# Patient Record
Sex: FEMALE | Race: WHITE | NOT HISPANIC OR LATINO
[De-identification: names, ages, dates, MRNs, and addresses within clinical notes are randomized per-mention and may not be internally consistent; named-entity substitution may affect disease eponyms.]

---

## 2017-04-19 ENCOUNTER — APPOINTMENT (OUTPATIENT)
Dept: DERMATOLOGY | Facility: CLINIC | Age: 77
End: 2017-04-19

## 2017-08-24 ENCOUNTER — INPATIENT (INPATIENT)
Facility: HOSPITAL | Age: 77
LOS: 0 days | Discharge: HOME | DRG: 392 | End: 2017-08-25
Attending: HOSPITALIST | Admitting: HOSPITALIST
Payer: MEDICARE

## 2017-08-24 VITALS — WEIGHT: 136.03 LBS | HEIGHT: 60 IN

## 2017-08-24 DIAGNOSIS — K57.32 DIVERTICULITIS OF LARGE INTESTINE WITHOUT PERFORATION OR ABSCESS WITHOUT BLEEDING: ICD-10-CM

## 2017-08-24 LAB
ALBUMIN SERPL ELPH-MCNC: 3.8 G/DL — SIGNIFICANT CHANGE UP (ref 3.3–5.2)
ALP SERPL-CCNC: 173 U/L — HIGH (ref 40–120)
ALT FLD-CCNC: 63 U/L — HIGH
ANION GAP SERPL CALC-SCNC: 13 MMOL/L — SIGNIFICANT CHANGE UP (ref 5–17)
ANISOCYTOSIS BLD QL: SLIGHT — SIGNIFICANT CHANGE UP
APPEARANCE UR: CLEAR — SIGNIFICANT CHANGE UP
AST SERPL-CCNC: 40 U/L — HIGH
BASOPHILS # BLD AUTO: 0 K/UL — SIGNIFICANT CHANGE UP (ref 0–0.2)
BASOPHILS NFR BLD AUTO: 1 % — SIGNIFICANT CHANGE UP (ref 0–2)
BILIRUB SERPL-MCNC: 0.6 MG/DL — SIGNIFICANT CHANGE UP (ref 0.4–2)
BILIRUB UR-MCNC: NEGATIVE — SIGNIFICANT CHANGE UP
BUN SERPL-MCNC: 14 MG/DL — SIGNIFICANT CHANGE UP (ref 8–20)
CALCIUM SERPL-MCNC: 9.7 MG/DL — SIGNIFICANT CHANGE UP (ref 8.6–10.2)
CHLORIDE SERPL-SCNC: 99 MMOL/L — SIGNIFICANT CHANGE UP (ref 98–107)
CO2 SERPL-SCNC: 27 MMOL/L — SIGNIFICANT CHANGE UP (ref 22–29)
COLOR SPEC: YELLOW — SIGNIFICANT CHANGE UP
CREAT SERPL-MCNC: 0.8 MG/DL — SIGNIFICANT CHANGE UP (ref 0.5–1.3)
DIFF PNL FLD: ABNORMAL
EOSINOPHIL # BLD AUTO: 0.1 K/UL — SIGNIFICANT CHANGE UP (ref 0–0.5)
EOSINOPHIL NFR BLD AUTO: 1 % — SIGNIFICANT CHANGE UP (ref 0–5)
EPI CELLS # UR: SIGNIFICANT CHANGE UP
GLUCOSE SERPL-MCNC: 95 MG/DL — SIGNIFICANT CHANGE UP (ref 70–115)
GLUCOSE UR QL: NEGATIVE MG/DL — SIGNIFICANT CHANGE UP
HCT VFR BLD CALC: 44.3 % — SIGNIFICANT CHANGE UP (ref 37–47)
HGB BLD-MCNC: 15.1 G/DL — SIGNIFICANT CHANGE UP (ref 12–16)
KETONES UR-MCNC: NEGATIVE — SIGNIFICANT CHANGE UP
LEUKOCYTE ESTERASE UR-ACNC: ABNORMAL
LYMPHOCYTES # BLD AUTO: 1.8 K/UL — SIGNIFICANT CHANGE UP (ref 1–4.8)
LYMPHOCYTES # BLD AUTO: 12 % — LOW (ref 20–55)
MACROCYTES BLD QL: SLIGHT — SIGNIFICANT CHANGE UP
MCHC RBC-ENTMCNC: 32.5 PG — HIGH (ref 27–31)
MCHC RBC-ENTMCNC: 34.1 G/DL — SIGNIFICANT CHANGE UP (ref 32–36)
MCV RBC AUTO: 95.5 FL — SIGNIFICANT CHANGE UP (ref 81–99)
MICROCYTES BLD QL: SLIGHT — SIGNIFICANT CHANGE UP
MONOCYTES # BLD AUTO: 1.8 K/UL — HIGH (ref 0–0.8)
MONOCYTES NFR BLD AUTO: 6 % — SIGNIFICANT CHANGE UP (ref 3–10)
NEUTROPHILS # BLD AUTO: 12.2 K/UL — HIGH (ref 1.8–8)
NEUTROPHILS NFR BLD AUTO: 77 % — HIGH (ref 37–73)
NEUTS BAND # BLD: 1 % — SIGNIFICANT CHANGE UP (ref 0–8)
NITRITE UR-MCNC: NEGATIVE — SIGNIFICANT CHANGE UP
OVALOCYTES BLD QL SMEAR: SLIGHT — SIGNIFICANT CHANGE UP
PH UR: 5 — SIGNIFICANT CHANGE UP (ref 5–8)
PLAT MORPH BLD: NORMAL — SIGNIFICANT CHANGE UP
PLATELET # BLD AUTO: 228 K/UL — SIGNIFICANT CHANGE UP (ref 150–400)
POIKILOCYTOSIS BLD QL AUTO: SLIGHT — SIGNIFICANT CHANGE UP
POTASSIUM SERPL-MCNC: 4 MMOL/L — SIGNIFICANT CHANGE UP (ref 3.5–5.3)
POTASSIUM SERPL-SCNC: 4 MMOL/L — SIGNIFICANT CHANGE UP (ref 3.5–5.3)
PROT SERPL-MCNC: 7.7 G/DL — SIGNIFICANT CHANGE UP (ref 6.6–8.7)
PROT UR-MCNC: NEGATIVE MG/DL — SIGNIFICANT CHANGE UP
RBC # BLD: 4.64 M/UL — SIGNIFICANT CHANGE UP (ref 4.4–5.2)
RBC # FLD: 13.1 % — SIGNIFICANT CHANGE UP (ref 11–15.6)
RBC BLD AUTO: ABNORMAL
RBC CASTS # UR COMP ASSIST: SIGNIFICANT CHANGE UP /HPF (ref 0–4)
SODIUM SERPL-SCNC: 139 MMOL/L — SIGNIFICANT CHANGE UP (ref 135–145)
SP GR SPEC: 1 — LOW (ref 1.01–1.02)
UROBILINOGEN FLD QL: NEGATIVE MG/DL — SIGNIFICANT CHANGE UP
VARIANT LYMPHS # BLD: 2 % — SIGNIFICANT CHANGE UP (ref 0–6)
WBC # BLD: 16 K/UL — HIGH (ref 4.8–10.8)
WBC # FLD AUTO: 16 K/UL — HIGH (ref 4.8–10.8)
WBC UR QL: SIGNIFICANT CHANGE UP

## 2017-08-24 PROCEDURE — 99223 1ST HOSP IP/OBS HIGH 75: CPT | Mod: AI

## 2017-08-24 PROCEDURE — 99285 EMERGENCY DEPT VISIT HI MDM: CPT

## 2017-08-24 RX ORDER — METRONIDAZOLE 500 MG
500 TABLET ORAL ONCE
Qty: 0 | Refills: 0 | Status: COMPLETED | OUTPATIENT
Start: 2017-08-24 | End: 2017-08-24

## 2017-08-24 RX ORDER — SODIUM CHLORIDE 9 MG/ML
1000 INJECTION INTRAMUSCULAR; INTRAVENOUS; SUBCUTANEOUS ONCE
Qty: 0 | Refills: 0 | Status: COMPLETED | OUTPATIENT
Start: 2017-08-24 | End: 2017-08-24

## 2017-08-24 RX ORDER — CIPROFLOXACIN LACTATE 400MG/40ML
400 VIAL (ML) INTRAVENOUS ONCE
Qty: 0 | Refills: 0 | Status: COMPLETED | OUTPATIENT
Start: 2017-08-24 | End: 2017-08-24

## 2017-08-24 RX ORDER — ERTAPENEM SODIUM 1 G/1
1000 INJECTION, POWDER, LYOPHILIZED, FOR SOLUTION INTRAMUSCULAR; INTRAVENOUS EVERY 24 HOURS
Qty: 0 | Refills: 0 | Status: DISCONTINUED | OUTPATIENT
Start: 2017-08-25 | End: 2017-08-25

## 2017-08-24 RX ORDER — SODIUM CHLORIDE 9 MG/ML
1000 INJECTION INTRAMUSCULAR; INTRAVENOUS; SUBCUTANEOUS
Qty: 0 | Refills: 0 | Status: DISCONTINUED | OUTPATIENT
Start: 2017-08-24 | End: 2017-08-25

## 2017-08-24 RX ORDER — ERTAPENEM SODIUM 1 G/1
1000 INJECTION, POWDER, LYOPHILIZED, FOR SOLUTION INTRAMUSCULAR; INTRAVENOUS ONCE
Qty: 0 | Refills: 0 | Status: COMPLETED | OUTPATIENT
Start: 2017-08-24 | End: 2017-08-24

## 2017-08-24 RX ORDER — ERTAPENEM SODIUM 1 G/1
INJECTION, POWDER, LYOPHILIZED, FOR SOLUTION INTRAMUSCULAR; INTRAVENOUS
Qty: 0 | Refills: 0 | Status: DISCONTINUED | OUTPATIENT
Start: 2017-08-24 | End: 2017-08-25

## 2017-08-24 RX ORDER — ACETAMINOPHEN 500 MG
650 TABLET ORAL EVERY 6 HOURS
Qty: 0 | Refills: 0 | Status: DISCONTINUED | OUTPATIENT
Start: 2017-08-24 | End: 2017-08-25

## 2017-08-24 RX ORDER — SODIUM CHLORIDE 9 MG/ML
3 INJECTION INTRAMUSCULAR; INTRAVENOUS; SUBCUTANEOUS ONCE
Qty: 0 | Refills: 0 | Status: COMPLETED | OUTPATIENT
Start: 2017-08-24 | End: 2017-08-24

## 2017-08-24 RX ORDER — ACETAMINOPHEN 500 MG
975 TABLET ORAL ONCE
Qty: 0 | Refills: 0 | Status: COMPLETED | OUTPATIENT
Start: 2017-08-24 | End: 2017-08-24

## 2017-08-24 RX ADMIN — Medication 200 MILLIGRAM(S): at 11:57

## 2017-08-24 RX ADMIN — SODIUM CHLORIDE 1000 MILLILITER(S): 9 INJECTION INTRAMUSCULAR; INTRAVENOUS; SUBCUTANEOUS at 12:12

## 2017-08-24 RX ADMIN — SODIUM CHLORIDE 75 MILLILITER(S): 9 INJECTION INTRAMUSCULAR; INTRAVENOUS; SUBCUTANEOUS at 15:58

## 2017-08-24 RX ADMIN — ERTAPENEM SODIUM 120 MILLIGRAM(S): 1 INJECTION, POWDER, LYOPHILIZED, FOR SOLUTION INTRAMUSCULAR; INTRAVENOUS at 14:32

## 2017-08-24 RX ADMIN — SODIUM CHLORIDE 75 MILLILITER(S): 9 INJECTION INTRAMUSCULAR; INTRAVENOUS; SUBCUTANEOUS at 23:48

## 2017-08-24 RX ADMIN — SODIUM CHLORIDE 3 MILLILITER(S): 9 INJECTION INTRAMUSCULAR; INTRAVENOUS; SUBCUTANEOUS at 13:43

## 2017-08-24 RX ADMIN — Medication 975 MILLIGRAM(S): at 16:38

## 2017-08-24 RX ADMIN — Medication 100 MILLIGRAM(S): at 11:17

## 2017-08-24 NOTE — H&P ADULT - NSHPPHYSICALEXAM_GEN_ALL_CORE
General appearance: NAD, Awake, Alert  HEENT: NCAT, Conjunctiva clear, EOMI, Pupils reactive  Neck: Supple, No JVD, No tenderness  Lungs: Clear to auscultation, Breath sound equal bilaterally, No wheezes, No rales  Cardiovascular: S1S2, Regular rhythm  Abdomen: Soft, Nondistended, No guarding/rebound, Positive bowel sounds, Mild lower quadrant tenderness  Extremities: No clubbing, No cyanosis, No edema, No calf tenderness  Neuro: Strength equal bilaterally, No tremors  Psychiatric: Appropriate mood, Normal affect

## 2017-08-24 NOTE — ED ADULT NURSE NOTE - OBJECTIVE STATEMENT
pt alert and awake x3, arrived to ED with abd cramping and discomfort x9 days, states she was at PMD office, CT scan was preformed, results showed diverticulitis, pt was prescribed, cipro and flagyl, states the meds are making her stomach cramp more, got nervous when she caw clear mucous instead of stool when going to bathroom, denies urinary problems, denies chest pain/sob, will continue to monitor

## 2017-08-24 NOTE — H&P ADULT - ASSESSMENT
77F with diverticulitis and worsening abdominal pain    Diverticulitis - Intravenous antibiotics. CBC to be repeated to monitor leukocytosis. Serial abdominal examinations to follow.     Hypertension - Lotrel to be held for now. Close blood pressure monitoring.

## 2017-08-24 NOTE — ED STATDOCS - OBJECTIVE STATEMENT
78 y/o F pt with PMHx of diverticulosis of colon, spinal stenosis, HTN, and MVP presents to ED c/o waxing and waning, lower abdominal pain and back pain x9 days. Pt was seen by Dr. Bowden yesterday, where a CT was done at [facility], and she was sent to the ED for further evaluation of diverticulitis. Pt reports she was started on 2 abx (Cipro and Flagyl) yesterday. She states she has the urge for a BM, but "mucous stuff comes out." On the first day of her pain, she had 1 episode of vomiting and diarrhea. Pt also notes decreased PO intake. Pt denies fever, chills, CP, SOB, dysuria, hematuria, urinary frequency/urgency/hesitancy, back pain, headache, and dizziness. No further complaints at this time. Allergy to iodine, amoxicillin, and codeine. 78 y/o F pt with PMHx of diverticulosis of colon, spinal stenosis, HTN, and MVP presents to ED c/o waxing and waning, lower abdominal pain and back pain x9 days. Pt was seen by Dr. Murillo yesterday, where a CT was done at [facility]. Pt reports she was started on 2 abx (Cipro and Flagyl) yesterday: took 3 doses of flagyl but only 1 dose of cipro. She states she has the urge for a BM, but "mucous stuff comes out." On the first day of her pain, she had 1 episode of vomiting and diarrhea. Pt also notes decreased PO intake. Pt denies fever, chills, CP, SOB, dysuria, hematuria, urinary frequency/urgency/hesitancy, back pain, headache, and dizziness. No further complaints at this time. Allergy to iodine, amoxicillin, and codeine.

## 2017-08-24 NOTE — ED STATDOCS - ATTENDING CONTRIBUTION TO CARE
I, Jermanie Owen, performed the initial face to face bedside interview with this patient regarding history of present illness, review of symptoms and relevant past medical, social and family history.  I completed an independent physical examination.  I was the provider who initially evaluated this patient.  The history, relevant review of systems, past medical and surgical history, medical decision making, and physical examination was documented by the scribe in my presence and I attest to the accuracy of the documentation. Follow-up on ordered tests (ie labs, radiologic studies) and re-evaluation of the patient's status has been communicated to the ACP.  Disposition of the patient will be based on test outcome and response to ED interventions.

## 2017-08-24 NOTE — ED STATDOCS - PMH
Diverticulosis of the Colon    History of Spinal Stenosis    HTN (Hypertension)    MVP (Mitral Valve Prolapse)    Normal delivery  X 2

## 2017-08-24 NOTE — ED STATDOCS - PSH
Bladder Prolapse, Acquired  Geovany procedure  History of Laminectomy  lumbar for stenosis with bone graft fusion  History of Rotator Cuff Surgery  laparoscopic repair right  S/P Appendectomy    S/P Arthroscopy  left shoulder for bone spurs  S/P Exploratory Laparotomy  for pelvic mass/benign/not removed/ "shrunk after menopause"  S/P Lumbar Spinal Fusion    Skin Lesion  cryo procedure to scalp skin lesion

## 2017-08-24 NOTE — H&P ADULT - HISTORY OF PRESENT ILLNESS
77F presented with abdominal pain. The patient reports a history of abdominal pain starting about one week ago. She had presented to her primary care physician and was noted to have diverticulitis on CT scan and was started on oral antibiotics. The patient had initiated antibiotic therapy but had continued to have abdominal pain. She denied any fever or chills at home. She had one episode of vomiting initially and then had episodes of diarrhea. The patient had recurrent episodes of abdominal pain with any attempt to eat. She denied any change in her diet, recent travel, or sick contacts. The pain was located in the lower abdomen and was sharp in nature. She denied any rectal bleeding and noted that she was passing mucous with her bowel movements. She also had back pain which she attributed to her prior spinal surgery.    Outpatient CT abdomen results reported diverticulitis without evidence of abscess

## 2017-08-24 NOTE — ED STATDOCS - MEDICAL DECISION MAKING DETAILS
Eval for diverticulitis. diverticulitis, failing outpatient oral antibiotic treatment:  labs and possible admit.

## 2017-08-25 ENCOUNTER — TRANSCRIPTION ENCOUNTER (OUTPATIENT)
Age: 77
End: 2017-08-25

## 2017-08-25 VITALS
HEART RATE: 75 BPM | OXYGEN SATURATION: 94 % | RESPIRATION RATE: 18 BRPM | TEMPERATURE: 99 F | SYSTOLIC BLOOD PRESSURE: 126 MMHG | DIASTOLIC BLOOD PRESSURE: 74 MMHG

## 2017-08-25 LAB
ANION GAP SERPL CALC-SCNC: 15 MMOL/L — SIGNIFICANT CHANGE UP (ref 5–17)
BUN SERPL-MCNC: 8 MG/DL — SIGNIFICANT CHANGE UP (ref 8–20)
CALCIUM SERPL-MCNC: 8.5 MG/DL — LOW (ref 8.6–10.2)
CHLORIDE SERPL-SCNC: 106 MMOL/L — SIGNIFICANT CHANGE UP (ref 98–107)
CO2 SERPL-SCNC: 20 MMOL/L — LOW (ref 22–29)
CREAT SERPL-MCNC: 0.65 MG/DL — SIGNIFICANT CHANGE UP (ref 0.5–1.3)
GLUCOSE SERPL-MCNC: 99 MG/DL — SIGNIFICANT CHANGE UP (ref 70–115)
HCT VFR BLD CALC: 40.3 % — SIGNIFICANT CHANGE UP (ref 37–47)
HGB BLD-MCNC: 13.3 G/DL — SIGNIFICANT CHANGE UP (ref 12–16)
MCHC RBC-ENTMCNC: 31.8 PG — HIGH (ref 27–31)
MCHC RBC-ENTMCNC: 33 G/DL — SIGNIFICANT CHANGE UP (ref 32–36)
MCV RBC AUTO: 96.4 FL — SIGNIFICANT CHANGE UP (ref 81–99)
PLATELET # BLD AUTO: 195 K/UL — SIGNIFICANT CHANGE UP (ref 150–400)
POTASSIUM SERPL-MCNC: 3.7 MMOL/L — SIGNIFICANT CHANGE UP (ref 3.5–5.3)
POTASSIUM SERPL-SCNC: 3.7 MMOL/L — SIGNIFICANT CHANGE UP (ref 3.5–5.3)
RBC # BLD: 4.18 M/UL — LOW (ref 4.4–5.2)
RBC # FLD: 13 % — SIGNIFICANT CHANGE UP (ref 11–15.6)
SODIUM SERPL-SCNC: 141 MMOL/L — SIGNIFICANT CHANGE UP (ref 135–145)
WBC # BLD: 10.1 K/UL — SIGNIFICANT CHANGE UP (ref 4.8–10.8)
WBC # FLD AUTO: 10.1 K/UL — SIGNIFICANT CHANGE UP (ref 4.8–10.8)

## 2017-08-25 PROCEDURE — 99239 HOSP IP/OBS DSCHRG MGMT >30: CPT

## 2017-08-25 RX ORDER — METRONIDAZOLE 500 MG
1 TABLET ORAL
Qty: 0 | Refills: 0 | COMMUNITY

## 2017-08-25 RX ORDER — AMLODIPINE BESYLATE AND BENAZEPRIL HYDROCHLORIDE 10; 20 MG/1; MG/1
1 CAPSULE ORAL
Qty: 0 | Refills: 0 | COMMUNITY

## 2017-08-25 RX ORDER — MOXIFLOXACIN HYDROCHLORIDE TABLETS, 400 MG 400 MG/1
1 TABLET, FILM COATED ORAL
Qty: 0 | Refills: 0 | COMMUNITY

## 2017-08-25 RX ADMIN — Medication 650 MILLIGRAM(S): at 13:30

## 2017-08-25 RX ADMIN — Medication 650 MILLIGRAM(S): at 12:54

## 2017-08-25 RX ADMIN — ERTAPENEM SODIUM 120 MILLIGRAM(S): 1 INJECTION, POWDER, LYOPHILIZED, FOR SOLUTION INTRAMUSCULAR; INTRAVENOUS at 14:20

## 2017-08-25 NOTE — DISCHARGE NOTE ADULT - CARE PROVIDER_API CALL
Rex Murillo (MD), Internal Medicine  [address] [place] [address]  Phone: [phone]  Fax: [phone]

## 2017-08-25 NOTE — PROGRESS NOTE ADULT - SUBJECTIVE AND OBJECTIVE BOX
PETRONA GRANDA   ------------------------------  The patient was seen and evaluated for diverticulitis.  The patient is in no acute distress.  Denied any chest pain, palpitations, or shortness of breath.  Less abdominal pain today. Tolerating oral intake.    Vital Signs Last 24 Hrs  T(C): 36.8 (24 Aug 2017 23:20), Max: 36.9 (24 Aug 2017 10:23)  T(F): 98.2 (24 Aug 2017 23:20), Max: 98.4 (24 Aug 2017 10:23)  HR: 76 (24 Aug 2017 23:20) (76 - 79)  BP: 123/73 (24 Aug 2017 23:20) (115/74 - 149/79)  BP(mean): --  RR: 20 (24 Aug 2017 23:20) (18 - 20)  SpO2: 95% (24 Aug 2017 23:20) (95% - 98%)    PHYSICAL EXAMINATION:  ----------------------------------------  General appearance: NAD, Awake, Alert  HEENT: NCAT, Conjunctiva clear, EOMI, Pupils reactive  Neck: Supple, No JVD, No tenderness  Lungs: Clear to auscultation, Breath sound equal bilaterally, No wheezes, No rales  Cardiovascular: S1S2, Regular rhythm  Abdomen: Soft, Nontender, Nondistended, No guarding/rebound, Positive bowel sounds  Extremities: No clubbing, No cyanosis, No edema, No calf tenderness  Neuro: Strength equal bilaterally, No tremors  Psychiatric: Appropriate mood, Normal affect    LABS:  ------------------------------             13.3   10.1  )-----------( 195      ( 25 Aug 2017 07:23 )             40.3     08-    141  |  106  |  8.0  ----------------------------<  99  3.7   |  20.0<L>  |  0.65    Ca    8.5<L>      25 Aug 2017 07:23    TPro  7.7  /  Alb  3.8  /  TBili  0.6  /  DBili  x   /  AST  40<H>  /  ALT  63<H>  /  AlkPhos  173<H>      LIVER FUNCTIONS - ( 24 Aug 2017 11:53 )  Alb: 3.8 g/dL / Pro: 7.7 g/dL / ALK PHOS: 173 U/L / ALT: 63 U/L / AST: 40 U/L / GGT: x           Urinalysis Basic - ( 24 Aug 2017 12:36 )  Color: Yellow / Appearance: Clear / S.005 / pH: x  Gluc: x / Ketone: Negative  / Bili: Negative / Urobili: Negative mg/dL   Blood: x / Protein: Negative mg/dL / Nitrite: Negative   Leuk Esterase: Trace / RBC: 0-2 /HPF / WBC 0-2   Sq Epi: x / Non Sq Epi: x / Bacteria: x    MEDICATIONS  (STANDING):  ertapenem  IVPB   IV Intermittent   sodium chloride 0.9%. 1000 milliLiter(s) (75 mL/Hr) IV Continuous <Continuous>  ertapenem  IVPB 1000 milliGRAM(s) IV Intermittent every 24 hours    MEDICATIONS  (PRN):  acetaminophen   Tablet. 650 milliGRAM(s) Oral every 6 hours PRN Mild Pain (1 - 3)      ASSESSMENT / PLAN:  -----------------------------------  Diverticulitis - On ertapenem. Afebrile, leukocytosis resolved. Abdominal examination was unremarkable.    Hypertension - Close blood pressure monitoring. Lotrel held on admission. PETRONA GRANDA   ------------------------------  The patient was seen and evaluated for diverticulitis.  The patient is in no acute distress.  Denied any chest pain, palpitations, or shortness of breath.  Less abdominal pain today. Reported that she was starting to have formed stool.    Vital Signs Last 24 Hrs  T(C): 36.8 (24 Aug 2017 23:20), Max: 36.9 (24 Aug 2017 10:23)  T(F): 98.2 (24 Aug 2017 23:20), Max: 98.4 (24 Aug 2017 10:23)  HR: 76 (24 Aug 2017 23:20) (76 - 79)  BP: 123/73 (24 Aug 2017 23:20) (115/74 - 149/79)  BP(mean): --  RR: 20 (24 Aug 2017 23:20) (18 - 20)  SpO2: 95% (24 Aug 2017 23:20) (95% - 98%)    PHYSICAL EXAMINATION:  ----------------------------------------  General appearance: NAD, Awake, Alert  HEENT: NCAT, Conjunctiva clear, EOMI, Pupils reactive  Neck: Supple, No JVD, No tenderness  Lungs: Clear to auscultation, Breath sound equal bilaterally, No wheezes, No rales  Cardiovascular: S1S2, Regular rhythm  Abdomen: Soft, Nontender, Nondistended, No guarding/rebound, Positive bowel sounds  Extremities: No clubbing, No cyanosis, No edema, No calf tenderness  Neuro: Strength equal bilaterally, No tremors  Psychiatric: Appropriate mood, Normal affect    LABS:  ------------------------------             13.3   10.1  )-----------( 195      ( 25 Aug 2017 07:23 )             40.3     -    141  |  106  |  8.0  ----------------------------<  99  3.7   |  20.0<L>  |  0.65    Ca    8.5<L>      25 Aug 2017 07:23    TPro  7.7  /  Alb  3.8  /  TBili  0.6  /  DBili  x   /  AST  40<H>  /  ALT  63<H>  /  AlkPhos  173<H>  08    LIVER FUNCTIONS - ( 24 Aug 2017 11:53 )  Alb: 3.8 g/dL / Pro: 7.7 g/dL / ALK PHOS: 173 U/L / ALT: 63 U/L / AST: 40 U/L / GGT: x           Urinalysis Basic - ( 24 Aug 2017 12:36 )  Color: Yellow / Appearance: Clear / S.005 / pH: x  Gluc: x / Ketone: Negative  / Bili: Negative / Urobili: Negative mg/dL   Blood: x / Protein: Negative mg/dL / Nitrite: Negative   Leuk Esterase: Trace / RBC: 0-2 /HPF / WBC 0-2   Sq Epi: x / Non Sq Epi: x / Bacteria: x    MEDICATIONS  (STANDING):  ertapenem  IVPB   IV Intermittent   sodium chloride 0.9%. 1000 milliLiter(s) (75 mL/Hr) IV Continuous <Continuous>  ertapenem  IVPB 1000 milliGRAM(s) IV Intermittent every 24 hours    MEDICATIONS  (PRN):  acetaminophen   Tablet. 650 milliGRAM(s) Oral every 6 hours PRN Mild Pain (1 - 3)      ASSESSMENT / PLAN:  -----------------------------------  Diverticulitis - On ertapenem. Afebrile, leukocytosis resolved. Abdominal examination was unremarkable.    Hypertension - Close blood pressure monitoring. Lotrel held on admission.

## 2017-08-25 NOTE — DISCHARGE NOTE ADULT - PLAN OF CARE
. Complete the course of antibiotics as previously prescribed. Follow up with your primary care physician for further management. Resume Lotrel.

## 2017-08-25 NOTE — DISCHARGE NOTE ADULT - HOSPITAL COURSE
77F presented with a one week history of abdominal pain noted to have diverticulitis on an outpatient CT of the abdomen presented with worsening pain despite initiation of oral antibiotics y her primary care physician. On presentation, WBC(16). Intravenous antibiotics and fluids were initiated for diverticulitis. The patient had improvement in her condition and began to have formed stool. The patient was discharged home with instructions to complete the course of antibiotics as previously prescribed and to follow up with her primary care physician for further management. 77F presented with a one week history of abdominal pain noted to have diverticulitis on an outpatient CT of the abdomen presented with worsening pain despite initiation of oral antibiotics y her primary care physician. On presentation, WBC(16). Intravenous antibiotics and fluids were initiated for diverticulitis. The patient had improvement in her condition and began to have formed stool. The patient was discharged home with instructions to complete the course of antibiotics as previously prescribed and to follow up with her primary care physician for further management.    35 minutes total time

## 2017-08-25 NOTE — DISCHARGE NOTE ADULT - MEDICATION SUMMARY - MEDICATIONS TO TAKE
I will START or STAY ON the medications listed below when I get home from the hospital:    Flagyl 500 mg oral tablet  -- 1 tab(s) by mouth 3 times a day  -- Indication: For Diverticulitis    Lotrel 5 mg-20 mg oral capsule  -- 1 cap(s) by mouth once a day  -- Indication: For HTN    Cipro 500 mg oral tablet  -- 1 tab(s) by mouth every 12 hours  -- Indication: For Diverticulitis

## 2017-08-25 NOTE — DISCHARGE NOTE ADULT - CARE PLAN
Principal Discharge DX:	Diverticulitis of sigmoid colon  Goal:	.  Instructions for follow-up, activity and diet:	Complete the course of antibiotics as previously prescribed. Follow up with your primary care physician for further management.  Secondary Diagnosis:	HTN (hypertension)  Instructions for follow-up, activity and diet:	Resume Lotrel.

## 2017-08-25 NOTE — DISCHARGE NOTE ADULT - PATIENT PORTAL LINK FT
“You can access the Follow[employer] Patient Portal, offered by [facility], by registering with the following website: [email or web address]”

## 2017-08-30 LAB
CULTURE RESULTS: SIGNIFICANT CHANGE UP
CULTURE RESULTS: SIGNIFICANT CHANGE UP
SPECIMEN SOURCE: SIGNIFICANT CHANGE UP
SPECIMEN SOURCE: SIGNIFICANT CHANGE UP

## 2017-10-19 PROCEDURE — 80053 COMPREHEN METABOLIC PANEL: CPT

## 2017-10-19 PROCEDURE — 80048 BASIC METABOLIC PNL TOTAL CA: CPT

## 2017-10-19 PROCEDURE — 85027 COMPLETE CBC AUTOMATED: CPT

## 2017-10-19 PROCEDURE — 96376 TX/PRO/DX INJ SAME DRUG ADON: CPT

## 2017-10-19 PROCEDURE — 99285 EMERGENCY DEPT VISIT HI MDM: CPT | Mod: 25

## 2017-10-19 PROCEDURE — 96374 THER/PROPH/DIAG INJ IV PUSH: CPT

## 2017-10-19 PROCEDURE — 81001 URINALYSIS AUTO W/SCOPE: CPT

## 2017-10-19 PROCEDURE — 87040 BLOOD CULTURE FOR BACTERIA: CPT

## 2017-10-19 PROCEDURE — 96375 TX/PRO/DX INJ NEW DRUG ADDON: CPT

## 2017-10-19 PROCEDURE — 36415 COLL VENOUS BLD VENIPUNCTURE: CPT

## 2017-10-23 ENCOUNTER — APPOINTMENT (OUTPATIENT)
Dept: DERMATOLOGY | Facility: CLINIC | Age: 77
End: 2017-10-23
Payer: MEDICARE

## 2017-10-23 PROCEDURE — 99214 OFFICE O/P EST MOD 30 MIN: CPT

## 2018-07-09 DIAGNOSIS — Z78.9 OTHER SPECIFIED HEALTH STATUS: ICD-10-CM

## 2018-07-09 DIAGNOSIS — Z87.19 PERSONAL HISTORY OF OTHER DISEASES OF THE DIGESTIVE SYSTEM: ICD-10-CM

## 2018-07-10 ENCOUNTER — APPOINTMENT (OUTPATIENT)
Dept: CARDIOLOGY | Facility: CLINIC | Age: 78
End: 2018-07-10
Payer: MEDICARE

## 2018-07-10 VITALS
DIASTOLIC BLOOD PRESSURE: 90 MMHG | HEART RATE: 76 BPM | HEIGHT: 60 IN | RESPIRATION RATE: 16 BRPM | BODY MASS INDEX: 27.68 KG/M2 | SYSTOLIC BLOOD PRESSURE: 155 MMHG | WEIGHT: 141 LBS

## 2018-07-10 PROCEDURE — 93000 ELECTROCARDIOGRAM COMPLETE: CPT

## 2018-07-10 PROCEDURE — 99204 OFFICE O/P NEW MOD 45 MIN: CPT

## 2018-07-10 RX ORDER — LOSARTAN POTASSIUM 50 MG/1
50 TABLET, FILM COATED ORAL DAILY
Refills: 0 | Status: DISCONTINUED | COMMUNITY
End: 2018-07-10

## 2018-07-26 ENCOUNTER — APPOINTMENT (OUTPATIENT)
Dept: CARDIOLOGY | Facility: CLINIC | Age: 78
End: 2018-07-26
Payer: MEDICARE

## 2018-07-26 ENCOUNTER — MEDICATION RENEWAL (OUTPATIENT)
Age: 78
End: 2018-07-26

## 2018-07-26 PROCEDURE — 93306 TTE W/DOPPLER COMPLETE: CPT

## 2018-07-30 ENCOUNTER — APPOINTMENT (OUTPATIENT)
Dept: CARDIOLOGY | Facility: CLINIC | Age: 78
End: 2018-07-30
Payer: MEDICARE

## 2018-07-30 VITALS
BODY MASS INDEX: 27.09 KG/M2 | SYSTOLIC BLOOD PRESSURE: 142 MMHG | HEIGHT: 60 IN | WEIGHT: 138 LBS | RESPIRATION RATE: 14 BRPM | HEART RATE: 72 BPM | DIASTOLIC BLOOD PRESSURE: 80 MMHG

## 2018-07-30 DIAGNOSIS — M19.90 UNSPECIFIED OSTEOARTHRITIS, UNSPECIFIED SITE: ICD-10-CM

## 2018-07-30 PROCEDURE — 99214 OFFICE O/P EST MOD 30 MIN: CPT

## 2018-09-17 ENCOUNTER — APPOINTMENT (OUTPATIENT)
Dept: CARDIOLOGY | Facility: CLINIC | Age: 78
End: 2018-09-17

## 2018-09-17 ENCOUNTER — APPOINTMENT (OUTPATIENT)
Dept: CARDIOLOGY | Facility: CLINIC | Age: 78
End: 2018-09-17
Payer: MEDICARE

## 2018-09-17 VITALS
SYSTOLIC BLOOD PRESSURE: 139 MMHG | RESPIRATION RATE: 14 BRPM | BODY MASS INDEX: 27.29 KG/M2 | WEIGHT: 139 LBS | DIASTOLIC BLOOD PRESSURE: 86 MMHG | HEIGHT: 60 IN | HEART RATE: 72 BPM

## 2018-09-17 PROCEDURE — 99214 OFFICE O/P EST MOD 30 MIN: CPT

## 2018-09-17 PROCEDURE — 93000 ELECTROCARDIOGRAM COMPLETE: CPT

## 2018-10-24 ENCOUNTER — APPOINTMENT (OUTPATIENT)
Dept: DERMATOLOGY | Facility: CLINIC | Age: 78
End: 2018-10-24
Payer: MEDICARE

## 2018-10-24 PROCEDURE — 17000 DESTRUCT PREMALG LESION: CPT | Mod: 59

## 2018-10-24 PROCEDURE — 17110 DESTRUCTION B9 LES UP TO 14: CPT

## 2018-10-24 PROCEDURE — 99214 OFFICE O/P EST MOD 30 MIN: CPT | Mod: 25

## 2018-11-08 ENCOUNTER — APPOINTMENT (OUTPATIENT)
Dept: CARDIOLOGY | Facility: CLINIC | Age: 78
End: 2018-11-08
Payer: MEDICARE

## 2018-11-08 VITALS
DIASTOLIC BLOOD PRESSURE: 78 MMHG | BODY MASS INDEX: 27.29 KG/M2 | SYSTOLIC BLOOD PRESSURE: 138 MMHG | HEART RATE: 73 BPM | RESPIRATION RATE: 14 BRPM | WEIGHT: 139 LBS | HEIGHT: 60 IN

## 2018-11-08 PROCEDURE — 93000 ELECTROCARDIOGRAM COMPLETE: CPT

## 2018-11-08 PROCEDURE — 99214 OFFICE O/P EST MOD 30 MIN: CPT

## 2018-11-08 RX ORDER — HYDROCHLOROTHIAZIDE 12.5 MG/1
12.5 CAPSULE ORAL
Qty: 90 | Refills: 3 | Status: DISCONTINUED | COMMUNITY
Start: 2018-09-17 | End: 2018-11-08

## 2018-11-08 NOTE — REASON FOR VISIT
[FreeTextEntry1] : Mrs. Mcnally presents today for follow up evaluation of her hypertension and valvular heart disease.

## 2018-11-08 NOTE — PHYSICAL EXAM
[General Appearance - Well Developed] : well developed [General Appearance - Well Nourished] : well nourished [General Appearance - In No Acute Distress] : no acute distress [Normal Conjunctiva] : the conjunctiva exhibited no abnormalities [Normal Oral Mucosa] : normal oral mucosa [Auscultation Breath Sounds / Voice Sounds] : lungs were clear to auscultation bilaterally [Heart Rate And Rhythm] : heart rate and rhythm were normal [Heart Sounds] : normal S1 and S2 [Diastolic Grade ___/4] : A grade [unfilled]/4 diastolic murmur was heard. [Bowel Sounds] : normal bowel sounds [Abnormal Walk] : normal gait [Skin Color & Pigmentation] : normal skin color and pigmentation [Skin Turgor] : normal skin turgor [Oriented To Time, Place, And Person] : oriented to person, place, and time [Affect] : the affect was normal [Mood] : the mood was normal [FreeTextEntry1] : No edema

## 2018-11-08 NOTE — ASSESSMENT
[FreeTextEntry1] : 1.  EKG today reveals normal sinus rhythm at 72 bpm.  Non-specific ST-T changes.  \par 2.  Hypertension:  Blood pressure well controlled at this time on Amlodipine 5 mg daily.  Patient not taking hydrochlorothiazide.  Advised to follow a strict low-salt diet. \par 3.  Review of recent bloodwork demonstrates a total cholesterol of 223, HDL 76, TC/HDL ratio 2.9, , triglycerides 116.  Patient advised on a stricter low-fat / low-cholesterol diet.  She does not wish to take statin therapy at this point.  Will repeat bloodwork in 6 months.  \par 4.  Valvular heart disease:  Patient with known aortic valvular insufficiency.  Will undergo follow up echocardiography prior to her next visit in 6 months.  \par

## 2018-11-08 NOTE — HISTORY OF PRESENT ILLNESS
[FreeTextEntry1] : Patient presents today without complaints of exertional chest pain, shortness of breath, palpitations, lightheadedness, or syncope.  She recently presented with peripheral edema on 10 mg of Amlodipine.  This is completely abated since she decreased Amlodipine from 10 to 5 mg daily.  She did not begin hydrochlorothiazide and presents today for follow up.  She has experienced angioedema with ACE inhibitors and ARBs.

## 2019-01-21 ENCOUNTER — RESULT REVIEW (OUTPATIENT)
Age: 79
End: 2019-01-21

## 2019-01-22 ENCOUNTER — APPOINTMENT (OUTPATIENT)
Dept: DERMATOLOGY | Facility: CLINIC | Age: 79
End: 2019-01-22
Payer: MEDICARE

## 2019-01-22 PROCEDURE — 11102 TANGNTL BX SKIN SINGLE LES: CPT

## 2019-01-22 PROCEDURE — 99213 OFFICE O/P EST LOW 20 MIN: CPT | Mod: 25

## 2019-02-15 ENCOUNTER — APPOINTMENT (OUTPATIENT)
Dept: DERMATOLOGY | Facility: CLINIC | Age: 79
End: 2019-02-15
Payer: MEDICARE

## 2019-02-15 PROCEDURE — 17280 DSTR MAL LS F/E/E/N/L/M .5/<: CPT

## 2019-04-30 ENCOUNTER — APPOINTMENT (OUTPATIENT)
Dept: CARDIOLOGY | Facility: CLINIC | Age: 79
End: 2019-04-30
Payer: MEDICARE

## 2019-04-30 PROCEDURE — 93306 TTE W/DOPPLER COMPLETE: CPT

## 2019-05-06 ENCOUNTER — APPOINTMENT (OUTPATIENT)
Dept: CARDIOLOGY | Facility: CLINIC | Age: 79
End: 2019-05-06
Payer: MEDICARE

## 2019-05-06 ENCOUNTER — NON-APPOINTMENT (OUTPATIENT)
Age: 79
End: 2019-05-06

## 2019-05-06 VITALS
HEIGHT: 60 IN | BODY MASS INDEX: 28.07 KG/M2 | HEART RATE: 69 BPM | WEIGHT: 143 LBS | DIASTOLIC BLOOD PRESSURE: 80 MMHG | SYSTOLIC BLOOD PRESSURE: 128 MMHG | RESPIRATION RATE: 14 BRPM

## 2019-05-06 DIAGNOSIS — R53.83 OTHER FATIGUE: ICD-10-CM

## 2019-05-06 DIAGNOSIS — T78.3XXA ANGIONEUROTIC EDEMA, INITIAL ENCOUNTER: ICD-10-CM

## 2019-05-06 DIAGNOSIS — R60.9 EDEMA, UNSPECIFIED: ICD-10-CM

## 2019-05-06 DIAGNOSIS — T46.4X5A ANGIONEUROTIC EDEMA, INITIAL ENCOUNTER: ICD-10-CM

## 2019-05-06 PROCEDURE — 93000 ELECTROCARDIOGRAM COMPLETE: CPT

## 2019-05-06 PROCEDURE — 99214 OFFICE O/P EST MOD 30 MIN: CPT

## 2019-05-10 NOTE — REASON FOR VISIT
[FreeTextEntry1] : Mrs. Mcnally is a delightful 79-year-old white female with a past medical history significant for hypertension as well as valvular heart disease who presents for follow up evaluation.

## 2019-05-10 NOTE — HISTORY OF PRESENT ILLNESS
[FreeTextEntry1] : Mrs. Mcnally presents today without complaints of exertional chest pain, shortness of breath, palpitations, lightheadedness, or syncope.  She states she no longer has edema since lowering her Amlodipine from 10 to 5 mg daily.  She does complain of considerable fatigue and states she gets up 2-3 times per night.

## 2019-05-10 NOTE — ASSESSMENT
[FreeTextEntry1] : \par \par 1.  EKG today reveals sinus rhythm at 69 bpm.  Poor R-wave progression leads V1 through V4.  Non-specific ST-T changes. \par 2.  Fatigue:  Patient with apparent chronic fatigue.  I have advised her follow up with her PCP regarding bloodwork.  The patient’s sleeping habits may be a factor here as well.  A Lyme titer should be obtained as the patient states she spends a great deal of time outdoors. \par 3.  Valvular heart disease:  Patient with recent echocardiography demonstrating normal left ventricular function.  Ejection fraction preserved and estimated at approximately 55-60%.  Mild to moderate aortic valvular insufficiency is noted.  I do not believe that this is significant enough to cause fatigue. \par 4.  Hypertension:  Blood pressure under reasonable control at this time on Amlodipine 5 mg daily.  Patient wishes to continue Amlodipine and has been advised on a strict low-salt diet and weight loss.  If clinically stable, office visit six months.  \par \par

## 2019-05-10 NOTE — PHYSICAL EXAM
[General Appearance - Well Developed] : well developed [General Appearance - Well Nourished] : well nourished [General Appearance - In No Acute Distress] : no acute distress [Normal Conjunctiva] : the conjunctiva exhibited no abnormalities [Normal Oral Mucosa] : normal oral mucosa [Auscultation Breath Sounds / Voice Sounds] : lungs were clear to auscultation bilaterally [Heart Rate And Rhythm] : heart rate and rhythm were normal [Heart Sounds] : normal S1 and S2 [Diastolic Grade ___/4] : A grade [unfilled]/4 diastolic murmur was heard. [Bowel Sounds] : normal bowel sounds [Abnormal Walk] : normal gait [Skin Color & Pigmentation] : normal skin color and pigmentation [Skin Turgor] : normal skin turgor [Oriented To Time, Place, And Person] : oriented to person, place, and time [Affect] : the affect was normal [Mood] : the mood was normal [FreeTextEntry1] : As for gout

## 2019-10-07 ENCOUNTER — APPOINTMENT (OUTPATIENT)
Dept: CARDIOLOGY | Facility: CLINIC | Age: 79
End: 2019-10-07
Payer: MEDICARE

## 2019-10-07 ENCOUNTER — NON-APPOINTMENT (OUTPATIENT)
Age: 79
End: 2019-10-07

## 2019-10-07 VITALS
HEART RATE: 66 BPM | WEIGHT: 143 LBS | RESPIRATION RATE: 16 BRPM | SYSTOLIC BLOOD PRESSURE: 124 MMHG | BODY MASS INDEX: 28.07 KG/M2 | HEIGHT: 60 IN | DIASTOLIC BLOOD PRESSURE: 70 MMHG

## 2019-10-07 PROCEDURE — 99213 OFFICE O/P EST LOW 20 MIN: CPT

## 2019-10-07 PROCEDURE — 93000 ELECTROCARDIOGRAM COMPLETE: CPT

## 2019-10-10 NOTE — PHYSICAL EXAM
[General Appearance - Well Developed] : well developed [General Appearance - Well Nourished] : well nourished [General Appearance - In No Acute Distress] : no acute distress [Normal Oral Mucosa] : normal oral mucosa [Normal Conjunctiva] : the conjunctiva exhibited no abnormalities [Auscultation Breath Sounds / Voice Sounds] : lungs were clear to auscultation bilaterally [Heart Rate And Rhythm] : heart rate and rhythm were normal [Diastolic Grade ___/4] : A grade [unfilled]/4 diastolic murmur was heard. [Heart Sounds] : normal S1 and S2 [Abnormal Walk] : normal gait [Bowel Sounds] : normal bowel sounds [Skin Color & Pigmentation] : normal skin color and pigmentation [Oriented To Time, Place, And Person] : oriented to person, place, and time [Skin Turgor] : normal skin turgor [Affect] : the affect was normal [Mood] : the mood was normal [FreeTextEntry1] : No edema

## 2019-10-10 NOTE — ASSESSMENT
[FreeTextEntry1] :  1.  EKG today reveals sinus rhythm at 66 bpm.  Non-specific ST-T changes.  Left atrial enlargement.  2.  Hypertension:  Blood pressure well controlled at this time on current medications.  A low-salt diet is advised.  3.  Valvular heart disease:  Patient with known history of mild to moderate aortic valvular regurgitation as well as mild mitral and tricuspid regurgitation.  Clinically stable at this time.  I have advised her to continue all current medications and follow up in six months if clinically stable.

## 2019-10-10 NOTE — REASON FOR VISIT
[FreeTextEntry1] : Mrs. Mcnally is a delightful 79-year-old white female with a past medical history significant for hypertension as well as valvular heart disease who presents for follow up evaluation.   \par \par From a cardiac standpoint, Mrs. Mcnally remains clinically stable denying chest pain, shortness of breath, or other cardiac symptoms.  \par

## 2019-10-23 ENCOUNTER — APPOINTMENT (OUTPATIENT)
Dept: DERMATOLOGY | Facility: CLINIC | Age: 79
End: 2019-10-23
Payer: MEDICARE

## 2019-10-23 PROCEDURE — 99214 OFFICE O/P EST MOD 30 MIN: CPT

## 2019-10-25 ENCOUNTER — RX RENEWAL (OUTPATIENT)
Age: 79
End: 2019-10-25

## 2019-10-27 ENCOUNTER — RX RENEWAL (OUTPATIENT)
Age: 79
End: 2019-10-27

## 2020-04-16 ENCOUNTER — APPOINTMENT (OUTPATIENT)
Dept: CARDIOLOGY | Facility: CLINIC | Age: 80
End: 2020-04-16

## 2020-04-22 ENCOUNTER — APPOINTMENT (OUTPATIENT)
Dept: DERMATOLOGY | Facility: CLINIC | Age: 80
End: 2020-04-22

## 2020-05-04 ENCOUNTER — APPOINTMENT (OUTPATIENT)
Dept: DERMATOLOGY | Facility: CLINIC | Age: 80
End: 2020-05-04
Payer: MEDICARE

## 2020-05-04 PROCEDURE — 99213 OFFICE O/P EST LOW 20 MIN: CPT | Mod: 25

## 2020-05-04 PROCEDURE — 17000 DESTRUCT PREMALG LESION: CPT

## 2020-11-03 ENCOUNTER — RX RENEWAL (OUTPATIENT)
Age: 80
End: 2020-11-03

## 2020-11-03 RX ORDER — AMLODIPINE BESYLATE 5 MG/1
5 TABLET ORAL
Qty: 90 | Refills: 0 | Status: ACTIVE | COMMUNITY
Start: 2018-07-10 | End: 1900-01-01

## 2020-11-16 ENCOUNTER — APPOINTMENT (OUTPATIENT)
Dept: DERMATOLOGY | Facility: CLINIC | Age: 80
End: 2020-11-16
Payer: MEDICARE

## 2020-11-16 PROCEDURE — 17000 DESTRUCT PREMALG LESION: CPT

## 2020-11-16 PROCEDURE — 99214 OFFICE O/P EST MOD 30 MIN: CPT | Mod: 25

## 2021-03-22 ENCOUNTER — APPOINTMENT (OUTPATIENT)
Dept: CARDIOLOGY | Facility: CLINIC | Age: 81
End: 2021-03-22

## 2021-04-26 ENCOUNTER — APPOINTMENT (OUTPATIENT)
Dept: CARDIOLOGY | Facility: CLINIC | Age: 81
End: 2021-04-26
Payer: MEDICARE

## 2021-04-26 VITALS
RESPIRATION RATE: 16 BRPM | DIASTOLIC BLOOD PRESSURE: 66 MMHG | WEIGHT: 136 LBS | HEIGHT: 60 IN | TEMPERATURE: 97.4 F | HEART RATE: 76 BPM | SYSTOLIC BLOOD PRESSURE: 128 MMHG | BODY MASS INDEX: 26.7 KG/M2

## 2021-04-26 DIAGNOSIS — I35.1 NONRHEUMATIC AORTIC (VALVE) INSUFFICIENCY: ICD-10-CM

## 2021-04-26 DIAGNOSIS — I10 ESSENTIAL (PRIMARY) HYPERTENSION: ICD-10-CM

## 2021-04-26 PROCEDURE — 99213 OFFICE O/P EST LOW 20 MIN: CPT

## 2021-04-26 PROCEDURE — 93000 ELECTROCARDIOGRAM COMPLETE: CPT

## 2021-04-26 RX ORDER — ASPIRIN 81 MG
81 TABLET, DELAYED RELEASE (ENTERIC COATED) ORAL
Refills: 0 | Status: ACTIVE | COMMUNITY

## 2021-04-27 PROBLEM — I10 HIGH BLOOD PRESSURE: Status: ACTIVE | Noted: 2018-07-09

## 2021-04-27 PROBLEM — I35.1 NONRHEUMATIC AORTIC VALVE INSUFFICIENCY: Status: ACTIVE | Noted: 2018-07-30

## 2021-04-29 NOTE — REASON FOR VISIT
[FreeTextEntry1] : Mrs. Mcnally is a pleasant 81-year-old white female with a past medical history significant for hypertension as well as valvular heart disease who presents for follow up evaluation. \par \par

## 2021-04-29 NOTE — ASSESSMENT
[FreeTextEntry1] : 1.  EKG today reveals sinus rhythm at 76 bpm.  Normal intervals.  Non-specific ST-T changes.  \par \par 2.  Hypertension:  Blood pressure well controlled at this time.  Patient is advised on a low-salt diet.  \par \par 3.  Review of recent bloodwork did not include a lipid profile.  I have advised a patient on a low-fat / low-cholesterol diet and follow up with her PCP regarding additional bloodwork.  \par \par 4.  Valvular heart disease:  Echocardiography performed two years ago revealed normal left ventricular chamber dimensions and wall motion with preserved ejection fraction estimated between 55 and 60%.  The left atrium and right-sided chambers revealed normal dimensions and function.  Mild to moderate aortic valvular insufficiency as well as mild mitral regurgitation and mild tricuspid regurgitation were noted.  Pulmonary artery pressures at that time were within normal limits.  \par \par 5.  Given the absence of any new symptoms, will defer echocardiography until her next visit.  \par

## 2021-04-29 NOTE — PHYSICAL EXAM
[Well Developed] : well developed [Well Nourished] : well nourished [No Acute Distress] : no acute distress [Normal Venous Pressure] : normal venous pressure [No Carotid Bruit] : no carotid bruit [Normal S1, S2] : normal S1, S2 [No Murmur] : no murmur [No Rub] : no rub [No Gallop] : no gallop [Clear Lung Fields] : clear lung fields [Good Air Entry] : good air entry [No Respiratory Distress] : no respiratory distress  [Soft] : abdomen soft [Non Tender] : non-tender [No Masses/organomegaly] : no masses/organomegaly [Normal Bowel Sounds] : normal bowel sounds [Normal Gait] : normal gait [No Edema] : no edema [No Cyanosis] : no cyanosis [No Clubbing] : no clubbing [No Varicosities] : no varicosities [No Rash] : no rash [No Skin Lesions] : no skin lesions [Moves all extremities] : moves all extremities [No Focal Deficits] : no focal deficits [Normal Speech] : normal speech [Alert and Oriented] : alert and oriented [Normal memory] : normal memory [General Appearance - Well Developed] : well developed [General Appearance - Well Nourished] : well nourished [General Appearance - In No Acute Distress] : no acute distress [Normal Conjunctiva] : the conjunctiva exhibited no abnormalities [Normal Oral Mucosa] : normal oral mucosa [Auscultation Breath Sounds / Voice Sounds] : lungs were clear to auscultation bilaterally [Heart Rate And Rhythm] : heart rate and rhythm were normal [Heart Sounds] : normal S1 and S2 [Diastolic Grade ___/4] : A grade [unfilled]/4 diastolic murmur was heard. [Bowel Sounds] : normal bowel sounds [Abnormal Walk] : normal gait [Skin Color & Pigmentation] : normal skin color and pigmentation [Skin Turgor] : normal skin turgor [Oriented To Time, Place, And Person] : oriented to person, place, and time [Affect] : the affect was normal [Mood] : the mood was normal [FreeTextEntry1] : No edema

## 2021-04-29 NOTE — HISTORY OF PRESENT ILLNESS
[FreeTextEntry1] : From a cardiac standpoint, Mrs. Mcnally continues to do well.  She denies exertional chest pain, shortness of breath, palpitations, lightheadedness, or syncope.  And remains physically active.

## 2021-06-23 ENCOUNTER — RESULT REVIEW (OUTPATIENT)
Age: 81
End: 2021-06-23

## 2021-06-24 ENCOUNTER — APPOINTMENT (OUTPATIENT)
Dept: DERMATOLOGY | Facility: CLINIC | Age: 81
End: 2021-06-24
Payer: MEDICARE

## 2021-06-24 PROCEDURE — 99212 OFFICE O/P EST SF 10 MIN: CPT | Mod: 25

## 2021-06-24 PROCEDURE — 17000 DESTRUCT PREMALG LESION: CPT | Mod: 59

## 2021-06-24 PROCEDURE — 17281 DSTR MAL LS F/E/E/N/L/M .6-1: CPT

## 2021-11-12 ENCOUNTER — APPOINTMENT (OUTPATIENT)
Dept: DERMATOLOGY | Facility: CLINIC | Age: 81
End: 2021-11-12
Payer: MEDICARE

## 2021-11-12 PROCEDURE — 17110 DESTRUCTION B9 LES UP TO 14: CPT

## 2021-11-12 PROCEDURE — 99214 OFFICE O/P EST MOD 30 MIN: CPT | Mod: 25

## 2021-12-02 NOTE — ED ADULT NURSE NOTE - LOCATION
abdomen Nsaids Counseling: NSAID Counseling: I discussed with the patient that NSAIDs should be taken with food. Prolonged use of NSAIDs can result in the development of stomach ulcers.  Patient advised to stop taking NSAIDs if abdominal pain occurs.  The patient verbalized understanding of the proper use and possible adverse effects of NSAIDs.  All of the patient's questions and concerns were addressed.

## 2022-01-24 ENCOUNTER — RESULT REVIEW (OUTPATIENT)
Age: 82
End: 2022-01-24

## 2022-01-25 ENCOUNTER — APPOINTMENT (OUTPATIENT)
Dept: DERMATOLOGY | Facility: CLINIC | Age: 82
End: 2022-01-25
Payer: MEDICARE

## 2022-01-25 PROCEDURE — 99213 OFFICE O/P EST LOW 20 MIN: CPT | Mod: 25

## 2022-01-25 PROCEDURE — 17000 DESTRUCT PREMALG LESION: CPT | Mod: 59

## 2022-01-25 PROCEDURE — 17110 DESTRUCTION B9 LES UP TO 14: CPT

## 2022-01-25 PROCEDURE — 11102 TANGNTL BX SKIN SINGLE LES: CPT | Mod: 59

## 2022-02-25 ENCOUNTER — APPOINTMENT (OUTPATIENT)
Dept: DERMATOLOGY | Facility: CLINIC | Age: 82
End: 2022-02-25

## 2022-04-04 ENCOUNTER — APPOINTMENT (OUTPATIENT)
Dept: DERMATOLOGY | Facility: CLINIC | Age: 82
End: 2022-04-04
Payer: MEDICARE

## 2022-04-04 PROCEDURE — 17003 DESTRUCT PREMALG LES 2-14: CPT

## 2022-04-04 PROCEDURE — 99213 OFFICE O/P EST LOW 20 MIN: CPT | Mod: 25

## 2022-04-04 PROCEDURE — 17000 DESTRUCT PREMALG LESION: CPT

## 2022-04-15 ENCOUNTER — APPOINTMENT (OUTPATIENT)
Dept: CARDIOLOGY | Facility: CLINIC | Age: 82
End: 2022-04-15

## 2022-10-05 ENCOUNTER — APPOINTMENT (OUTPATIENT)
Dept: DERMATOLOGY | Facility: CLINIC | Age: 82
End: 2022-10-05

## 2022-10-05 PROCEDURE — 17000 DESTRUCT PREMALG LESION: CPT

## 2022-10-05 PROCEDURE — 99213 OFFICE O/P EST LOW 20 MIN: CPT | Mod: 25

## 2023-03-07 ENCOUNTER — OFFICE (OUTPATIENT)
Dept: URBAN - METROPOLITAN AREA CLINIC 115 | Facility: CLINIC | Age: 83
Setting detail: OPHTHALMOLOGY
End: 2023-03-07
Payer: MEDICARE

## 2023-03-07 DIAGNOSIS — H01.9: ICD-10-CM

## 2023-03-07 DIAGNOSIS — H35.373: ICD-10-CM

## 2023-03-07 DIAGNOSIS — H43.811: ICD-10-CM

## 2023-03-07 DIAGNOSIS — H25.13: ICD-10-CM

## 2023-03-07 DIAGNOSIS — H01.004: ICD-10-CM

## 2023-03-07 DIAGNOSIS — H01.001: ICD-10-CM

## 2023-03-07 PROCEDURE — 92014 COMPRE OPH EXAM EST PT 1/>: CPT | Performed by: OPHTHALMOLOGY

## 2023-03-07 ASSESSMENT — SPHEQUIV_DERIVED
OD_SPHEQUIV: 2.5
OS_SPHEQUIV: 1.625
OS_SPHEQUIV: 2
OD_SPHEQUIV: 3
OS_SPHEQUIV: 1.75
OD_SPHEQUIV: 2.5

## 2023-03-07 ASSESSMENT — VISUAL ACUITY
OS_BCVA: 20/30-2
OD_BCVA: 20/50

## 2023-03-07 ASSESSMENT — REFRACTION_MANIFEST
OD_AXIS: 085
OS_CYLINDER: -1.00
OD_VA1: 20/NI
OD_AXIS: 075
OS_VA1: 20/30
OS_SPHERE: +2.25
OD_VA1: 20/30+
OS_CYLINDER: -1.00
OS_SPHERE: +2.50
OS_ADD: +2.50
OD_CYLINDER: -1.00
OD_CYLINDER: -1.00
OD_ADD: +2.50
OD_SPHERE: +3.50
OS_ADD: +2.50
OS_VA1: 20/NI
OS_AXIS: 095
OD_SPHERE: +3.00
OD_ADD: +2.50
OS_AXIS: 090

## 2023-03-07 ASSESSMENT — AXIALLENGTH_DERIVED
OS_AL: 22.6245
OD_AL: 22.3128
OD_AL: 22.4884
OS_AL: 22.7597
OS_AL: 22.7144
OD_AL: 22.4884

## 2023-03-07 ASSESSMENT — REFRACTION_CURRENTRX
OD_OVR_VA: 20/
OS_AXIS: 091
OD_AXIS: 082
OD_ADD: +2.25
OS_CYLINDER: -0.75
OS_VPRISM_DIRECTION: PROGS
OS_SPHERE: +2.00
OS_ADD: +2.25
OD_VPRISM_DIRECTION: PROGS
OS_OVR_VA: 20/
OD_SPHERE: +3.50
OD_CYLINDER: -1.00

## 2023-03-07 ASSESSMENT — KERATOMETRY
OD_K2POWER_DIOPTERS: 44.25
OD_AXISANGLE_DEGREES: 170
OS_AXISANGLE_DEGREES: 015
OS_K1POWER_DIOPTERS: 43.75
OD_K1POWER_DIOPTERS: 43.75
OS_K2POWER_DIOPTERS: 44.50
METHOD_AUTO_MANUAL: AUTO

## 2023-03-07 ASSESSMENT — TONOMETRY
OD_IOP_MMHG: 18
OS_IOP_MMHG: 17

## 2023-03-07 ASSESSMENT — LID EXAM ASSESSMENTS
OD_BLEPHARITIS: RUL 1+
OS_BLEPHARITIS: LUL 1+

## 2023-03-07 ASSESSMENT — CONFRONTATIONAL VISUAL FIELD TEST (CVF)
OD_FINDINGS: FULL
OS_FINDINGS: FULL

## 2023-03-07 ASSESSMENT — REFRACTION_AUTOREFRACTION
OS_CYLINDER: -1.25
OS_SPHERE: +2.25
OS_AXIS: 090
OD_SPHERE: +3.00
OD_AXIS: 083
OD_CYLINDER: -1.00

## 2023-04-24 ENCOUNTER — OFFICE (OUTPATIENT)
Dept: URBAN - METROPOLITAN AREA CLINIC 115 | Facility: CLINIC | Age: 83
Setting detail: OPHTHALMOLOGY
End: 2023-04-24
Payer: MEDICARE

## 2023-04-24 DIAGNOSIS — H01.004: ICD-10-CM

## 2023-04-24 DIAGNOSIS — H52.03: ICD-10-CM

## 2023-04-24 DIAGNOSIS — H43.811: ICD-10-CM

## 2023-04-24 DIAGNOSIS — H25.11: ICD-10-CM

## 2023-04-24 DIAGNOSIS — H25.13: ICD-10-CM

## 2023-04-24 DIAGNOSIS — H01.9: ICD-10-CM

## 2023-04-24 DIAGNOSIS — H01.001: ICD-10-CM

## 2023-04-24 DIAGNOSIS — H35.373: ICD-10-CM

## 2023-04-24 PROBLEM — H25.12 CATARACT SENILE NUCLEAR SCLEROSIS; RIGHT EYE, LEFT EYE, BOTH EYES: Status: ACTIVE | Noted: 2023-04-24

## 2023-04-24 PROCEDURE — 92136 OPHTHALMIC BIOMETRY: CPT | Performed by: OPHTHALMOLOGY

## 2023-04-24 PROCEDURE — 99214 OFFICE O/P EST MOD 30 MIN: CPT | Performed by: OPHTHALMOLOGY

## 2023-04-24 PROCEDURE — 92134 CPTRZ OPH DX IMG PST SGM RTA: CPT | Performed by: OPHTHALMOLOGY

## 2023-04-24 ASSESSMENT — REFRACTION_MANIFEST
OD_AXIS: 075
OS_VA1: 20/30
OS_ADD: +2.50
OS_ADD: +2.50
OD_CYLINDER: -1.00
OS_SPHERE: +2.50
OD_ADD: +2.50
OD_ADD: +2.50
OS_VA1: 20/NI
OD_VA1: 20/NI
OD_SPHERE: +3.50
OD_SPHERE: +3.00
OD_CYLINDER: -1.00
OS_SPHERE: +2.25
OS_AXIS: 090
OD_VA1: 20/30+
OS_CYLINDER: -1.00
OD_AXIS: 085
OS_CYLINDER: -1.00
OS_AXIS: 095

## 2023-04-24 ASSESSMENT — REFRACTION_CURRENTRX
OD_CYLINDER: -1.00
OS_OVR_VA: 20/
OD_VPRISM_DIRECTION: PROGS
OD_OVR_VA: 20/
OD_AXIS: 082
OS_CYLINDER: -0.75
OD_SPHERE: +3.50
OD_ADD: +2.25
OS_VPRISM_DIRECTION: PROGS
OS_ADD: +2.25
OS_SPHERE: +2.00
OS_AXIS: 091

## 2023-04-24 ASSESSMENT — KERATOMETRY
OD_CYLAXISANGLE_DEGREES: 170
OS_K2POWER_DIOPTERS: 44.50
OS_CYLPOWER_DEGREES: 0.75
OS_AXISANGLE2_DEGREES: 015
OD_AXISANGLE_DEGREES: 80
OD_K1K2_AVERAGE: 44
OD_CYLPOWER_DEGREES: 0.5
METHOD_AUTO_MANUAL: AUTO
OS_K2POWER_DIOPTERS: 44.50
OD_K2POWER_DIOPTERS: 44.25
OD_AXISANGLE_DEGREES: 170
OD_K1POWER_DIOPTERS: 43.75
OS_CYLAXISANGLE_DEGREES: 015
OS_K1POWER_DIOPTERS: 43.75
OS_AXISANGLE_DEGREES: 015
OD_K1POWER_DIOPTERS: 43.75
OS_AXISANGLE_DEGREES: 105
OS_K1K2_AVERAGE: 44.125
OD_K2POWER_DIOPTERS: 44.25
OS_K1POWER_DIOPTERS: 43.75
OD_AXISANGLE2_DEGREES: 170

## 2023-04-24 ASSESSMENT — REFRACTION_AUTOREFRACTION
OD_CYLINDER: -1.00
OD_SPHERE: +3.50
OD_AXIS: 082
OS_SPHERE: +2.75
OS_CYLINDER: -1.75
OS_AXIS: 089

## 2023-04-24 ASSESSMENT — VISUAL ACUITY
OS_BCVA: 20/40
OD_BCVA: 20/40-

## 2023-04-24 ASSESSMENT — LID EXAM ASSESSMENTS
OD_BLEPHARITIS: RUL 1+
OS_BLEPHARITIS: LUL 1+

## 2023-04-24 ASSESSMENT — SPHEQUIV_DERIVED
OS_SPHEQUIV: 1.875
OD_SPHEQUIV: 3
OD_SPHEQUIV: 2.5
OS_SPHEQUIV: 2
OS_SPHEQUIV: 1.75
OD_SPHEQUIV: 3

## 2023-04-24 ASSESSMENT — CONFRONTATIONAL VISUAL FIELD TEST (CVF)
OS_FINDINGS: FULL
OD_FINDINGS: FULL

## 2023-04-24 ASSESSMENT — AXIALLENGTH_DERIVED
OS_AL: 22.6694
OD_AL: 22.4884
OS_AL: 22.6245
OD_AL: 22.3128
OD_AL: 22.3128
OS_AL: 22.7144

## 2023-04-24 ASSESSMENT — TONOMETRY
OS_IOP_MMHG: 18
OD_IOP_MMHG: 18

## 2023-05-11 ENCOUNTER — ASC (OUTPATIENT)
Dept: URBAN - METROPOLITAN AREA SURGERY 8 | Facility: SURGERY | Age: 83
Setting detail: OPHTHALMOLOGY
End: 2023-05-11
Payer: MEDICARE

## 2023-05-11 DIAGNOSIS — H25.11: ICD-10-CM

## 2023-05-11 PROCEDURE — 66984 XCAPSL CTRC RMVL W/O ECP: CPT | Performed by: OPHTHALMOLOGY

## 2023-05-11 PROCEDURE — 68841 INSJ RX ELUT IMPLT LAC CANAL: CPT | Performed by: OPHTHALMOLOGY

## 2023-05-12 ENCOUNTER — OFFICE (OUTPATIENT)
Dept: URBAN - METROPOLITAN AREA CLINIC 115 | Facility: CLINIC | Age: 83
Setting detail: OPHTHALMOLOGY
End: 2023-05-12
Payer: MEDICARE

## 2023-05-12 ENCOUNTER — RX ONLY (RX ONLY)
Age: 83
End: 2023-05-12

## 2023-05-12 DIAGNOSIS — H25.12: ICD-10-CM

## 2023-05-12 DIAGNOSIS — Z96.1: ICD-10-CM

## 2023-05-12 PROCEDURE — 99024 POSTOP FOLLOW-UP VISIT: CPT | Performed by: OPTOMETRIST

## 2023-05-12 ASSESSMENT — CORNEAL EDEMA - FOLDS/STRIAE: OD_FOLDSSTRIAE: 2+

## 2023-05-12 ASSESSMENT — SPHEQUIV_DERIVED
OD_SPHEQUIV: 3
OS_SPHEQUIV: 2
OD_SPHEQUIV: 3
OS_SPHEQUIV: 1.875
OS_SPHEQUIV: 1.75
OD_SPHEQUIV: 2.5

## 2023-05-12 ASSESSMENT — LID EXAM ASSESSMENTS
OD_BLEPHARITIS: RUL 1+
OS_BLEPHARITIS: LUL 1+

## 2023-05-12 ASSESSMENT — AXIALLENGTH_DERIVED
OS_AL: 22.6245
OD_AL: 22.4884
OD_AL: 22.3128
OD_AL: 22.3128
OS_AL: 22.7144
OS_AL: 22.6694

## 2023-05-12 ASSESSMENT — REFRACTION_CURRENTRX
OD_CYLINDER: -1.00
OD_ADD: +2.25
OS_ADD: +2.25
OS_SPHERE: +2.00
OD_VPRISM_DIRECTION: PROGS
OD_SPHERE: +3.50
OS_VPRISM_DIRECTION: PROGS
OS_AXIS: 091
OS_OVR_VA: 20/
OD_OVR_VA: 20/
OS_CYLINDER: -0.75
OD_AXIS: 082

## 2023-05-12 ASSESSMENT — VISUAL ACUITY
OD_BCVA: 20/100
OS_BCVA: 20/40+1

## 2023-05-12 ASSESSMENT — REFRACTION_MANIFEST
OS_ADD: +2.50
OD_AXIS: 075
OD_VA1: 20/30+
OS_VA1: 20/NI
OS_SPHERE: +2.50
OS_ADD: +2.50
OD_AXIS: 085
OD_ADD: +2.50
OD_SPHERE: +3.00
OS_CYLINDER: -1.00
OD_VA1: 20/NI
OD_CYLINDER: -1.00
OD_SPHERE: +3.50
OS_AXIS: 095
OD_ADD: +2.50
OS_AXIS: 090
OS_VA1: 20/30
OD_CYLINDER: -1.00
OS_CYLINDER: -1.00
OS_SPHERE: +2.25

## 2023-05-12 ASSESSMENT — REFRACTION_AUTOREFRACTION
OS_AXIS: 089
OD_CYLINDER: -1.00
OS_CYLINDER: -1.75
OD_SPHERE: +3.50
OD_AXIS: 082
OS_SPHERE: +2.75

## 2023-05-12 ASSESSMENT — KERATOMETRY
OD_AXISANGLE_DEGREES: 170
METHOD_AUTO_MANUAL: AUTO
OS_AXISANGLE_DEGREES: 015
OD_K1POWER_DIOPTERS: 43.75
OS_K2POWER_DIOPTERS: 44.50
OS_K1POWER_DIOPTERS: 43.75
OD_K2POWER_DIOPTERS: 44.25

## 2023-05-12 ASSESSMENT — CONFRONTATIONAL VISUAL FIELD TEST (CVF)
OD_FINDINGS: FULL
OS_FINDINGS: FULL

## 2023-05-12 ASSESSMENT — TONOMETRY: OS_IOP_MMHG: 18

## 2023-05-18 ENCOUNTER — OFFICE (OUTPATIENT)
Dept: URBAN - METROPOLITAN AREA CLINIC 115 | Facility: CLINIC | Age: 83
Setting detail: OPHTHALMOLOGY
End: 2023-05-18
Payer: MEDICARE

## 2023-05-18 DIAGNOSIS — Z96.1: ICD-10-CM

## 2023-05-18 DIAGNOSIS — H25.12: ICD-10-CM

## 2023-05-18 PROCEDURE — 99024 POSTOP FOLLOW-UP VISIT: CPT | Performed by: OPHTHALMOLOGY

## 2023-05-18 ASSESSMENT — KERATOMETRY
OD_K2POWER_DIOPTERS: 44.25
OD_AXISANGLE_DEGREES: 170
METHOD_AUTO_MANUAL: AUTO
OD_K1POWER_DIOPTERS: 43.75
OS_K1POWER_DIOPTERS: 43.75
OS_K2POWER_DIOPTERS: 44.50
OS_AXISANGLE_DEGREES: 015

## 2023-05-18 ASSESSMENT — REFRACTION_AUTOREFRACTION
OS_SPHERE: +2.50
OS_AXIS: 090
OD_SPHERE: UTP
OS_CYLINDER: -1.25

## 2023-05-18 ASSESSMENT — REFRACTION_MANIFEST
OD_VA1: 20/NI
OD_ADD: +2.50
OS_SPHERE: +2.25
OD_SPHERE: +3.00
OS_AXIS: 095
OS_CYLINDER: -1.00
OD_AXIS: 075
OD_SPHERE: +3.50
OS_AXIS: 090
OS_VA1: 20/NI
OD_AXIS: 085
OS_SPHERE: +2.50
OS_ADD: +2.50
OD_CYLINDER: -1.00
OS_ADD: +2.50
OD_CYLINDER: -1.00
OD_ADD: +2.50
OD_VA1: 20/30+
OS_CYLINDER: -1.00
OS_VA1: 20/30

## 2023-05-18 ASSESSMENT — REFRACTION_CURRENTRX
OD_OVR_VA: 20/
OS_AXIS: 091
OD_SPHERE: +3.50
OS_VPRISM_DIRECTION: PROGS
OD_AXIS: 082
OS_ADD: +2.25
OS_OVR_VA: 20/
OS_CYLINDER: -0.75
OD_CYLINDER: -1.00
OD_ADD: +2.25
OD_VPRISM_DIRECTION: PROGS
OS_SPHERE: +2.00

## 2023-05-18 ASSESSMENT — AXIALLENGTH_DERIVED
OD_AL: 22.3128
OS_AL: 22.6694
OS_AL: 22.6245
OS_AL: 22.7144
OD_AL: 22.4884

## 2023-05-18 ASSESSMENT — VISUAL ACUITY
OD_BCVA: 20/30-
OS_BCVA: 20/60-2

## 2023-05-18 ASSESSMENT — SPHEQUIV_DERIVED
OS_SPHEQUIV: 1.75
OS_SPHEQUIV: 1.875
OS_SPHEQUIV: 2
OD_SPHEQUIV: 3
OD_SPHEQUIV: 2.5

## 2023-05-18 ASSESSMENT — TONOMETRY: OD_IOP_MMHG: 16

## 2023-05-18 ASSESSMENT — CORNEAL EDEMA - FOLDS/STRIAE: OD_FOLDSSTRIAE: T 1+

## 2023-05-18 ASSESSMENT — CONFRONTATIONAL VISUAL FIELD TEST (CVF)
OD_FINDINGS: FULL
OS_FINDINGS: FULL

## 2023-05-18 ASSESSMENT — LID EXAM ASSESSMENTS
OD_BLEPHARITIS: RUL 1+
OS_BLEPHARITIS: LUL 1+

## 2023-05-23 ENCOUNTER — OFFICE (OUTPATIENT)
Dept: URBAN - METROPOLITAN AREA CLINIC 115 | Facility: CLINIC | Age: 83
Setting detail: OPHTHALMOLOGY
End: 2023-05-23
Payer: MEDICARE

## 2023-05-23 DIAGNOSIS — H25.12: ICD-10-CM

## 2023-05-23 DIAGNOSIS — Z96.1: ICD-10-CM

## 2023-05-23 PROCEDURE — 99024 POSTOP FOLLOW-UP VISIT: CPT | Performed by: OPHTHALMOLOGY

## 2023-05-23 ASSESSMENT — REFRACTION_AUTOREFRACTION
OS_AXIS: 083
OD_CYLINDER: -1.40
OD_AXIS: 102
OS_CYLINDER: -1.00
OS_SPHERE: +1.75
OD_SPHERE: +1.00

## 2023-05-23 ASSESSMENT — REFRACTION_MANIFEST
OD_ADD: +2.50
OS_CYLINDER: -1.00
OS_AXIS: 095
OS_SPHERE: +2.25
OS_VA1: 20/30
OS_ADD: +2.50
OS_AXIS: 090
OD_VA1: 20/NI
OD_ADD: +2.50
OS_ADD: +2.50
OD_SPHERE: +3.50
OD_AXIS: 085
OD_VA1: 20/30+
OS_CYLINDER: -1.00
OD_CYLINDER: -1.00
OD_AXIS: 075
OS_VA1: 20/NI
OD_CYLINDER: -1.00
OS_SPHERE: +2.50
OD_SPHERE: +3.00

## 2023-05-23 ASSESSMENT — REFRACTION_CURRENTRX
OD_VPRISM_DIRECTION: PROGS
OD_AXIS: 082
OS_CYLINDER: -0.75
OD_SPHERE: +3.50
OS_ADD: +2.25
OD_OVR_VA: 20/
OS_AXIS: 091
OD_ADD: +2.25
OS_OVR_VA: 20/
OS_VPRISM_DIRECTION: PROGS
OS_SPHERE: +2.00
OD_CYLINDER: -1.00

## 2023-05-23 ASSESSMENT — SPHEQUIV_DERIVED
OS_SPHEQUIV: 2
OD_SPHEQUIV: 3
OS_SPHEQUIV: 1.25
OD_SPHEQUIV: 0.3
OS_SPHEQUIV: 1.75
OD_SPHEQUIV: 2.5

## 2023-05-23 ASSESSMENT — AXIALLENGTH_DERIVED
OS_AL: 22.8965
OD_AL: 22.3128
OD_AL: 23.2951
OD_AL: 22.4884
OS_AL: 22.7144
OS_AL: 22.6245

## 2023-05-23 ASSESSMENT — TONOMETRY
OS_IOP_MMHG: 19
OD_IOP_MMHG: 16

## 2023-05-23 ASSESSMENT — CONFRONTATIONAL VISUAL FIELD TEST (CVF)
OD_FINDINGS: FULL
OS_FINDINGS: FULL

## 2023-05-23 ASSESSMENT — KERATOMETRY
OS_K1POWER_DIOPTERS: 43.75
OS_K2POWER_DIOPTERS: 44.50
OS_AXISANGLE_DEGREES: 015
OD_K2POWER_DIOPTERS: 44.25
METHOD_AUTO_MANUAL: AUTO
OD_K1POWER_DIOPTERS: 43.75
OD_AXISANGLE_DEGREES: 170

## 2023-05-23 ASSESSMENT — VISUAL ACUITY
OD_BCVA: 20/30-
OS_BCVA: 20/80-1

## 2023-05-23 ASSESSMENT — LID EXAM ASSESSMENTS
OD_BLEPHARITIS: RUL 1+
OS_BLEPHARITIS: LUL 1+

## 2023-05-23 ASSESSMENT — CORNEAL EDEMA - FOLDS/STRIAE: OD_FOLDSSTRIAE: T 1+

## 2023-06-13 ENCOUNTER — OFFICE (OUTPATIENT)
Dept: URBAN - METROPOLITAN AREA CLINIC 115 | Facility: CLINIC | Age: 83
Setting detail: OPHTHALMOLOGY
End: 2023-06-13
Payer: MEDICARE

## 2023-06-13 DIAGNOSIS — H25.12: ICD-10-CM

## 2023-06-13 DIAGNOSIS — Z96.1: ICD-10-CM

## 2023-06-13 PROCEDURE — 99024 POSTOP FOLLOW-UP VISIT: CPT | Performed by: OPHTHALMOLOGY

## 2023-06-13 ASSESSMENT — REFRACTION_CURRENTRX
OS_CYLINDER: -0.75
OD_AXIS: 082
OS_AXIS: 091
OD_CYLINDER: -1.00
OS_SPHERE: +2.00
OD_ADD: +2.25
OD_VPRISM_DIRECTION: PROGS
OS_VPRISM_DIRECTION: PROGS
OS_OVR_VA: 20/
OS_ADD: +2.25
OD_SPHERE: +3.50
OD_OVR_VA: 20/

## 2023-06-13 ASSESSMENT — REFRACTION_MANIFEST
OS_SPHERE: +2.25
OD_CYLINDER: -1.00
OD_SPHERE: +3.00
OS_AXIS: 090
OD_SPHERE: +3.50
OS_AXIS: 095
OD_AXIS: 075
OS_CYLINDER: -1.00
OS_SPHERE: +2.50
OS_VA1: 20/NI
OD_VA1: 20/30+
OS_VA1: 20/30
OD_CYLINDER: -1.00
OD_VA1: 20/NI
OD_ADD: +2.50
OS_ADD: +2.50
OS_CYLINDER: -1.00
OD_ADD: +2.50
OS_ADD: +2.50
OD_AXIS: 085

## 2023-06-13 ASSESSMENT — KERATOMETRY
METHOD_AUTO_MANUAL: AUTO
OS_AXISANGLE_DEGREES: 015
OS_K2POWER_DIOPTERS: 44.50
OS_K1POWER_DIOPTERS: 43.75
OD_AXISANGLE_DEGREES: 170
OD_K1POWER_DIOPTERS: 43.75
OD_K2POWER_DIOPTERS: 44.25

## 2023-06-13 ASSESSMENT — CONFRONTATIONAL VISUAL FIELD TEST (CVF)
OD_FINDINGS: FULL
OS_FINDINGS: FULL

## 2023-06-13 ASSESSMENT — AXIALLENGTH_DERIVED
OD_AL: 23.1255
OS_AL: 22.7144
OS_AL: 22.7144
OD_AL: 22.4884
OS_AL: 22.6245
OD_AL: 22.3128

## 2023-06-13 ASSESSMENT — REFRACTION_AUTOREFRACTION
OD_CYLINDER: -2.00
OS_AXIS: 097
OS_CYLINDER: -1.50
OS_SPHERE: +2.50
OD_AXIS: 096
OD_SPHERE: +1.75

## 2023-06-13 ASSESSMENT — CORNEAL EDEMA CLINICAL DESCRIPTION: OD_CORNEALEDEMA: T

## 2023-06-13 ASSESSMENT — SPHEQUIV_DERIVED
OD_SPHEQUIV: 2.5
OD_SPHEQUIV: 0.75
OD_SPHEQUIV: 3
OS_SPHEQUIV: 2
OS_SPHEQUIV: 1.75
OS_SPHEQUIV: 1.75

## 2023-06-13 ASSESSMENT — VISUAL ACUITY
OD_BCVA: 20/30-
OS_BCVA: 20/40-2

## 2023-06-13 ASSESSMENT — TONOMETRY
OS_IOP_MMHG: 20
OD_IOP_MMHG: 14

## 2023-06-13 ASSESSMENT — LID EXAM ASSESSMENTS
OD_BLEPHARITIS: RUL 1+
OS_BLEPHARITIS: LUL 1+

## 2023-06-27 ENCOUNTER — OFFICE (OUTPATIENT)
Dept: URBAN - METROPOLITAN AREA CLINIC 115 | Facility: CLINIC | Age: 83
Setting detail: OPHTHALMOLOGY
End: 2023-06-27
Payer: MEDICARE

## 2023-06-27 DIAGNOSIS — H25.12: ICD-10-CM

## 2023-06-27 DIAGNOSIS — Z96.1: ICD-10-CM

## 2023-06-27 PROCEDURE — 99024 POSTOP FOLLOW-UP VISIT: CPT | Performed by: OPHTHALMOLOGY

## 2023-06-27 ASSESSMENT — SPHEQUIV_DERIVED
OS_SPHEQUIV: 3.625
OS_SPHEQUIV: 1.125
OS_SPHEQUIV: 1.125
OD_SPHEQUIV: 0.5
OD_SPHEQUIV: 0.375
OD_SPHEQUIV: 3
OD_SPHEQUIV: 3
OS_SPHEQUIV: 1.75

## 2023-06-27 ASSESSMENT — REFRACTION_MANIFEST
OS_AXIS: 090
OS_AXIS: 095
OD_AXIS: 075
OD_VA1: 20/25+
OD_ADD: +2.50
OD_SPHERE: +1.00
OD_SPHERE: +3.50
OS_ADD: +2.50
OD_ADD: +2.50
OS_SPHERE: +2.25
OS_VA1: 20/30
OU_VA: 20/25+
OD_CYLINDER: -1.00
OS_VA1: 20/50+
OD_AXIS: 100
OS_ADD: +2.50
OS_CYLINDER: -0.75
OD_AXIS: 100
OD_CYLINDER: -1.00
OD_CYLINDER: -1.00
OS_AXIS: 090
OD_SPHERE: +3.50
OS_CYLINDER: -1.00
OD_VA1: 20/30+
OS_SPHERE: +4.00
OS_SPHERE: +1.50
OS_CYLINDER: -0.75

## 2023-06-27 ASSESSMENT — REFRACTION_CURRENTRX
OD_AXIS: 082
OS_AXIS: 091
OD_OVR_VA: 20/
OS_ADD: +2.25
OS_SPHERE: +2.00
OD_SPHERE: +3.50
OS_VPRISM_DIRECTION: PROGS
OD_VPRISM_DIRECTION: PROGS
OS_OVR_VA: 20/
OD_ADD: +2.25
OS_CYLINDER: -0.75
OD_CYLINDER: -1.00

## 2023-06-27 ASSESSMENT — LID EXAM ASSESSMENTS
OD_BLEPHARITIS: RUL 1+
OS_BLEPHARITIS: LUL 1+

## 2023-06-27 ASSESSMENT — REFRACTION_AUTOREFRACTION
OS_AXIS: 090
OD_SPHERE: +1.00
OS_SPHERE: +1.50
OD_AXIS: 102
OS_CYLINDER: -0.75
OD_CYLINDER: -1.25

## 2023-06-27 ASSESSMENT — TONOMETRY
OS_IOP_MMHG: 16
OD_IOP_MMHG: 13

## 2023-06-27 ASSESSMENT — KERATOMETRY
OS_AXISANGLE_DEGREES: 015
METHOD_AUTO_MANUAL: AUTO
OD_AXISANGLE_DEGREES: 170
OS_K1POWER_DIOPTERS: 43.75
OS_K2POWER_DIOPTERS: 44.50
OD_K2POWER_DIOPTERS: 44.25
OD_K1POWER_DIOPTERS: 43.75

## 2023-06-27 ASSESSMENT — CONFRONTATIONAL VISUAL FIELD TEST (CVF)
OD_FINDINGS: FULL
OS_FINDINGS: FULL

## 2023-06-27 ASSESSMENT — VISUAL ACUITY
OD_BCVA: 20/50
OS_BCVA: 20/25

## 2023-06-27 ASSESSMENT — AXIALLENGTH_DERIVED
OD_AL: 23.2194
OD_AL: 23.2667
OD_AL: 22.3128
OS_AL: 22.0569
OS_AL: 22.9424
OS_AL: 22.7144
OS_AL: 22.9424
OD_AL: 22.3128

## 2023-08-24 ENCOUNTER — OFFICE (OUTPATIENT)
Dept: URBAN - METROPOLITAN AREA CLINIC 115 | Facility: CLINIC | Age: 83
Setting detail: OPHTHALMOLOGY
End: 2023-08-24
Payer: MEDICARE

## 2023-08-24 DIAGNOSIS — Z96.1: ICD-10-CM

## 2023-08-24 DIAGNOSIS — H25.12: ICD-10-CM

## 2023-08-24 PROCEDURE — 92012 INTRM OPH EXAM EST PATIENT: CPT | Performed by: OPTOMETRIST

## 2023-08-24 ASSESSMENT — REFRACTION_MANIFEST
OS_ADD: +2.50
OS_VA1: 20/30
OD_CYLINDER: -1.00
OD_AXIS: 075
OD_ADD: +2.50
OS_VA1: 20/50+
OD_VA1: 20/30+
OD_VA1: 20/25+
OD_ADD: +2.50
OS_SPHERE: +2.25
OD_SPHERE: +3.50
OU_VA: 20/25+
OD_CYLINDER: -1.00
OS_SPHERE: +4.00
OD_SPHERE: +3.50
OS_ADD: +2.50
OD_CYLINDER: -1.00
OD_AXIS: 100
OS_AXIS: 095
OS_SPHERE: +1.50
OD_SPHERE: +1.00
OD_AXIS: 100
OS_AXIS: 090
OS_CYLINDER: -1.00
OS_CYLINDER: -0.75
OS_AXIS: 090
OS_CYLINDER: -0.75

## 2023-08-24 ASSESSMENT — REFRACTION_AUTOREFRACTION
OD_AXIS: 100
OD_CYLINDER: -1.25
OS_CYLINDER: -0.75
OD_SPHERE: +1.00
OS_SPHERE: +1.50
OS_AXIS: 096

## 2023-08-24 ASSESSMENT — SPHEQUIV_DERIVED
OS_SPHEQUIV: 3.625
OD_SPHEQUIV: 0.375
OD_SPHEQUIV: 3
OS_SPHEQUIV: 1.125
OD_SPHEQUIV: 0.5
OD_SPHEQUIV: 3
OS_SPHEQUIV: 1.75
OS_SPHEQUIV: 1.125

## 2023-08-24 ASSESSMENT — LID EXAM ASSESSMENTS
OS_BLEPHARITIS: LUL 1+
OD_BLEPHARITIS: RUL 1+

## 2023-08-24 ASSESSMENT — KERATOMETRY
METHOD_AUTO_MANUAL: AUTO
OS_K2POWER_DIOPTERS: 44.50
OD_K2POWER_DIOPTERS: 44.25
OS_AXISANGLE_DEGREES: 015
OS_K1POWER_DIOPTERS: 43.75
OD_K1POWER_DIOPTERS: 43.75
OD_AXISANGLE_DEGREES: 170

## 2023-08-24 ASSESSMENT — CONFRONTATIONAL VISUAL FIELD TEST (CVF)
OD_FINDINGS: FULL
OS_FINDINGS: FULL

## 2023-08-24 ASSESSMENT — REFRACTION_CURRENTRX
OS_VPRISM_DIRECTION: PROGS
OD_VPRISM_DIRECTION: PROGS
OD_AXIS: 082
OS_OVR_VA: 20/
OD_CYLINDER: -1.00
OS_SPHERE: +2.00
OD_ADD: +2.25
OS_ADD: +2.25
OD_OVR_VA: 20/
OS_AXIS: 091
OD_SPHERE: +3.50
OS_CYLINDER: -0.75

## 2023-08-24 ASSESSMENT — AXIALLENGTH_DERIVED
OS_AL: 22.7144
OD_AL: 23.2194
OS_AL: 22.9424
OS_AL: 22.9424
OS_AL: 22.0569
OD_AL: 22.3128
OD_AL: 22.3128
OD_AL: 23.2667

## 2023-08-24 ASSESSMENT — VISUAL ACUITY
OS_BCVA: 20/30-
OD_BCVA: 20/30-2

## 2023-08-24 ASSESSMENT — TONOMETRY
OD_IOP_MMHG: 15
OS_IOP_MMHG: 17

## 2023-09-18 NOTE — PATIENT PROFILE ADULT. - PATIENT REPRESENTATIVE: ( YOU CAN CHOOSE ANY PERSON THAT CAN ASSIST YOU WITH YOUR HEALTH CARE PREFERENCES, DOES NOT HAVE TO BE A SPOUSE, IMMEDIATE FAMILY OR SIGNIFICANT OTHER/PARTNER)
-- Make follow up appointment with Dr. Mejia 1 week after Biopsy.      You have been referred to Radiology. Radiology schedulers will call you to schedule your appointment. Or you may call [phone] for an Ultrasound Guided Fine Needle Aspiration.           
Declines

## 2023-10-05 ENCOUNTER — APPOINTMENT (OUTPATIENT)
Dept: DERMATOLOGY | Facility: CLINIC | Age: 83
End: 2023-10-05
Payer: MEDICARE

## 2023-10-05 PROCEDURE — 99213 OFFICE O/P EST LOW 20 MIN: CPT

## 2023-10-20 ENCOUNTER — OFFICE (OUTPATIENT)
Dept: URBAN - METROPOLITAN AREA CLINIC 115 | Facility: CLINIC | Age: 83
Setting detail: OPHTHALMOLOGY
End: 2023-10-20
Payer: MEDICARE

## 2023-10-20 DIAGNOSIS — H35.033: ICD-10-CM

## 2023-10-20 DIAGNOSIS — H16.223: ICD-10-CM

## 2023-10-20 DIAGNOSIS — H01.001: ICD-10-CM

## 2023-10-20 DIAGNOSIS — H02.834: ICD-10-CM

## 2023-10-20 DIAGNOSIS — H35.373: ICD-10-CM

## 2023-10-20 DIAGNOSIS — H25.12: ICD-10-CM

## 2023-10-20 DIAGNOSIS — H01.004: ICD-10-CM

## 2023-10-20 DIAGNOSIS — H43.811: ICD-10-CM

## 2023-10-20 DIAGNOSIS — H02.831: ICD-10-CM

## 2023-10-20 DIAGNOSIS — H35.363: ICD-10-CM

## 2023-10-20 PROCEDURE — 92250 FUNDUS PHOTOGRAPHY W/I&R: CPT | Performed by: OPTOMETRIST

## 2023-10-20 PROCEDURE — 99213 OFFICE O/P EST LOW 20 MIN: CPT | Performed by: OPTOMETRIST

## 2023-10-20 ASSESSMENT — REFRACTION_MANIFEST
OD_SPHERE: +3.50
OD_ADD: +2.50
OS_CYLINDER: -0.75
OD_CYLINDER: -1.00
OS_AXIS: 090
OD_AXIS: 100
OS_CYLINDER: -1.00
OS_VA1: 20/30
OD_CYLINDER: -1.00
OS_ADD: +2.50
OD_ADD: +2.50
OS_SPHERE: +4.00
OD_VA1: 20/25+
OS_CYLINDER: -0.75
OD_VA1: 20/30+
OD_SPHERE: +3.50
OS_VA1: 20/50+
OD_CYLINDER: -1.00
OS_SPHERE: +2.25
OS_AXIS: 090
OD_AXIS: 075
OU_VA: 20/25+
OS_AXIS: 095
OD_SPHERE: +1.00
OS_ADD: +2.50
OS_SPHERE: +1.50
OD_AXIS: 100

## 2023-10-20 ASSESSMENT — AXIALLENGTH_DERIVED
OD_AL: 22.3128
OD_AL: 22.3128
OD_AL: 23.2194
OS_AL: 22.0569
OS_AL: 23.0349
OS_AL: 22.7144
OD_AL: 23.1723
OS_AL: 22.9424

## 2023-10-20 ASSESSMENT — LID EXAM ASSESSMENTS
OD_BLEPHARITIS: RUL 1+
OS_BLEPHARITIS: LUL 1+
OS_DERMATOCHALASIS: 1+
OD_DERMATOCHALASIS: 1+

## 2023-10-20 ASSESSMENT — REFRACTION_CURRENTRX
OS_CYLINDER: -0.75
OS_OVR_VA: 20/
OD_ADD: +2.25
OS_AXIS: 091
OS_VPRISM_DIRECTION: PROGS
OD_OVR_VA: 20/
OD_VPRISM_DIRECTION: PROGS
OD_AXIS: 082
OD_CYLINDER: -1.00
OD_SPHERE: +3.50
OS_ADD: +2.25
OS_SPHERE: +2.00

## 2023-10-20 ASSESSMENT — SPHEQUIV_DERIVED
OD_SPHEQUIV: 0.625
OS_SPHEQUIV: 1.125
OD_SPHEQUIV: 3
OS_SPHEQUIV: 0.875
OD_SPHEQUIV: 0.5
OD_SPHEQUIV: 3
OS_SPHEQUIV: 3.625
OS_SPHEQUIV: 1.75

## 2023-10-20 ASSESSMENT — REFRACTION_AUTOREFRACTION
OD_CYLINDER: -1.25
OS_SPHERE: +1.25
OS_CYLINDER: -0.75
OS_AXIS: 090
OD_SPHERE: +1.25
OD_AXIS: 095

## 2023-10-20 ASSESSMENT — KERATOMETRY
OS_K2POWER_DIOPTERS: 44.50
OD_AXISANGLE_DEGREES: 170
OD_K1POWER_DIOPTERS: 43.75
METHOD_AUTO_MANUAL: AUTO
OS_AXISANGLE_DEGREES: 015
OD_K2POWER_DIOPTERS: 44.25
OS_K1POWER_DIOPTERS: 43.75

## 2023-10-20 ASSESSMENT — TONOMETRY
OD_IOP_MMHG: 17
OS_IOP_MMHG: 18

## 2023-10-20 ASSESSMENT — VISUAL ACUITY
OD_BCVA: 20/50
OS_BCVA: 20/40-1

## 2023-10-20 ASSESSMENT — SUPERFICIAL PUNCTATE KERATITIS (SPK)
OD_SPK: 3+
OS_SPK: 3+

## 2023-10-20 ASSESSMENT — LID POSITION - DERMATOCHALASIS
OD_DERMATOCHALASIS: RUL 1+
OS_DERMATOCHALASIS: LUL 1+

## 2023-12-04 ENCOUNTER — OFFICE (OUTPATIENT)
Dept: URBAN - METROPOLITAN AREA CLINIC 115 | Facility: CLINIC | Age: 83
Setting detail: OPHTHALMOLOGY
End: 2023-12-04
Payer: MEDICARE

## 2023-12-04 DIAGNOSIS — H00.15: ICD-10-CM

## 2023-12-04 DIAGNOSIS — H01.001: ICD-10-CM

## 2023-12-04 DIAGNOSIS — H52.4: ICD-10-CM

## 2023-12-04 DIAGNOSIS — B88.0: ICD-10-CM

## 2023-12-04 DIAGNOSIS — H01.004: ICD-10-CM

## 2023-12-04 PROCEDURE — 99213 OFFICE O/P EST LOW 20 MIN: CPT | Performed by: OPHTHALMOLOGY

## 2023-12-04 PROCEDURE — 92015 DETERMINE REFRACTIVE STATE: CPT | Performed by: OPHTHALMOLOGY

## 2023-12-04 ASSESSMENT — REFRACTION_MANIFEST
OS_SPHERE: +1.00
OD_ADD: +2.50
OD_VA1: 20/30+
OS_CYLINDER: -0.75
OS_CYLINDER: -0.75
OS_SPHERE: +4.00
OS_ADD: +2.50
OS_VA1: 20/40
OU_VA: 20/25+
OS_AXIS: 090
OD_AXIS: 090
OS_CYLINDER: -1.00
OD_ADD: +2.25
OS_AXIS: 100
OS_ADD: +2.50
OS_VA1: 20/50+
OD_CYLINDER: -1.00
OD_SPHERE: +3.50
OS_AXIS: 090
OD_AXIS: 100
OD_VA1: 20/25+
OD_CYLINDER: -0.75
OD_AXIS: 100
OS_CYLINDER: -1.00
OS_SPHERE: +2.25
OS_ADD: +2.25
OD_AXIS: 075
OD_ADD: +2.50
OD_SPHERE: +3.50
OS_VA1: 20/30
OD_VA1: 20/25
OS_SPHERE: +1.50
OD_CYLINDER: -1.00
OD_SPHERE: +1.00
OD_CYLINDER: -1.00
OS_AXIS: 095
OD_SPHERE: +0.75

## 2023-12-04 ASSESSMENT — SPHEQUIV_DERIVED
OD_SPHEQUIV: 0.5
OD_SPHEQUIV: 0.75
OD_SPHEQUIV: 3
OD_SPHEQUIV: 3
OS_SPHEQUIV: 1.5
OS_SPHEQUIV: 3.625
OD_SPHEQUIV: 0.375
OS_SPHEQUIV: 1.75
OS_SPHEQUIV: 1.125
OS_SPHEQUIV: 0.5

## 2023-12-04 ASSESSMENT — REFRACTION_AUTOREFRACTION
OS_CYLINDER: -1.50
OD_CYLINDER: -1.00
OD_SPHERE: +1.25
OS_AXIS: 100
OS_SPHERE: +2.25
OD_AXIS: 087

## 2023-12-04 ASSESSMENT — REFRACTION_CURRENTRX
OD_VPRISM_DIRECTION: PROGS
OS_VPRISM_DIRECTION: PROGS
OD_OVR_VA: 20/
OS_SPHERE: +2.00
OS_CYLINDER: -0.75
OS_OVR_VA: 20/
OS_ADD: +2.25
OD_AXIS: 082
OD_ADD: +2.25
OD_SPHERE: +3.50
OS_AXIS: 091
OD_CYLINDER: -1.00

## 2023-12-04 ASSESSMENT — CONFRONTATIONAL VISUAL FIELD TEST (CVF)
OD_FINDINGS: FULL
OS_FINDINGS: FULL

## 2023-12-04 ASSESSMENT — LID EXAM ASSESSMENTS
OS_DERMATOCHALASIS: 1+
OD_BLEPHARITIS: RUL 1+
OS_BLEPHARITIS: LUL 1+
OD_DERMATOCHALASIS: 1+

## 2023-12-04 ASSESSMENT — LID POSITION - DERMATOCHALASIS
OD_DERMATOCHALASIS: RUL 1+
OS_DERMATOCHALASIS: LUL 1+

## 2023-12-04 ASSESSMENT — SUPERFICIAL PUNCTATE KERATITIS (SPK)
OD_SPK: 3+
OS_SPK: 3+

## 2024-04-15 ENCOUNTER — APPOINTMENT (OUTPATIENT)
Dept: OTOLARYNGOLOGY | Facility: CLINIC | Age: 84
End: 2024-04-15

## 2024-11-06 ENCOUNTER — APPOINTMENT (OUTPATIENT)
Dept: DERMATOLOGY | Facility: CLINIC | Age: 84
End: 2024-11-06
Payer: MEDICARE

## 2024-11-06 PROCEDURE — 99213 OFFICE O/P EST LOW 20 MIN: CPT
